# Patient Record
Sex: MALE | Race: BLACK OR AFRICAN AMERICAN | NOT HISPANIC OR LATINO | Employment: FULL TIME | URBAN - METROPOLITAN AREA
[De-identification: names, ages, dates, MRNs, and addresses within clinical notes are randomized per-mention and may not be internally consistent; named-entity substitution may affect disease eponyms.]

---

## 2019-04-24 ENCOUNTER — OFFICE VISIT (OUTPATIENT)
Dept: FAMILY MEDICINE | Facility: CLINIC | Age: 39
End: 2019-04-24
Payer: COMMERCIAL

## 2019-04-24 VITALS
DIASTOLIC BLOOD PRESSURE: 80 MMHG | OXYGEN SATURATION: 99 % | WEIGHT: 313 LBS | HEIGHT: 74 IN | HEART RATE: 66 BPM | SYSTOLIC BLOOD PRESSURE: 121 MMHG | BODY MASS INDEX: 40.17 KG/M2 | RESPIRATION RATE: 16 BRPM

## 2019-04-24 DIAGNOSIS — K21.9 GASTROESOPHAGEAL REFLUX DISEASE, ESOPHAGITIS PRESENCE NOT SPECIFIED: ICD-10-CM

## 2019-04-24 DIAGNOSIS — A04.8 H. PYLORI INFECTION: Primary | ICD-10-CM

## 2019-04-24 DIAGNOSIS — S39.012A STRAIN OF LUMBAR REGION, INITIAL ENCOUNTER: ICD-10-CM

## 2019-04-24 PROCEDURE — 99204 PR OFFICE/OUTPT VISIT, NEW, LEVL IV, 45-59 MIN: ICD-10-PCS | Mod: S$GLB,,, | Performed by: NURSE PRACTITIONER

## 2019-04-24 PROCEDURE — 99204 OFFICE O/P NEW MOD 45 MIN: CPT | Mod: S$GLB,,, | Performed by: NURSE PRACTITIONER

## 2019-04-24 PROCEDURE — 3008F BODY MASS INDEX DOCD: CPT | Mod: CPTII,S$GLB,, | Performed by: NURSE PRACTITIONER

## 2019-04-24 PROCEDURE — 3008F PR BODY MASS INDEX (BMI) DOCUMENTED: ICD-10-PCS | Mod: CPTII,S$GLB,, | Performed by: NURSE PRACTITIONER

## 2019-04-24 RX ORDER — SUCRALFATE 1 G/1
1 TABLET ORAL 2 TIMES DAILY
Qty: 60 TABLET | Refills: 0 | Status: SHIPPED | OUTPATIENT
Start: 2019-04-24 | End: 2021-03-30 | Stop reason: SDUPTHER

## 2019-04-24 RX ORDER — CYCLOBENZAPRINE HCL 10 MG
10 TABLET ORAL 3 TIMES DAILY PRN
Qty: 30 TABLET | Refills: 0 | Status: SHIPPED | OUTPATIENT
Start: 2019-04-24 | End: 2019-05-04

## 2019-04-24 NOTE — PROGRESS NOTES
Subjective:      Patient ID: Jorge A Lehman is a 38 y.o. male.    Chief Complaint: Establish Care (No prior pcp) and h pylori      Patient is a 38-year-old male here to establish primary care.     No medical history.    Current smoker. Drinks ETOH on occasions. Denies drug use.     Employed at [employer] as . .       States he was seen at an urgent care close to 4 weeks ago.  He was diagnosed with Helicobacter pylori infection and given tri pack of amoxicillin, clarithromycin, and Prevacid. PT states he finished antibiotics apx 2 weeks ago.  He currently denies epigastric pain. Denies N/V/D. Denies post prandial pain. Still has some GERD. He is no longer on the PPI.  He is trying to avoid foods that cause GERD.  These include spicy foods, red sauce, and cokes. Denies fever, chills. Appetite is normal.  He is instructed to get a primary care provider to test for resolution of the H pylori.         His 2nd complaint today is left lower back pain. The back pain started while he was at work at [employer].  He denies doing any heavy lifting at the time.  The pain initially started while on shift. Described as a sharp pain. Intermittent.  Pain is improving. Notices it when he bends certain ways. His wife was able to induce the pain by pressing on his back. Location is left lumbar/thoracic region. Denies dysuria, hematuria, suprapubic pain. Denies incontinence.       No other issues voiced.       ROS:   Review of Systems   Constitutional: Negative.    HENT: Negative.    Eyes: Negative.    Respiratory: Negative.    Cardiovascular: Negative.    Gastrointestinal: Negative.    Endocrine: Negative.    Genitourinary: Negative.    Musculoskeletal: Negative.    Skin: Negative.    Allergic/Immunologic: Negative.    Neurological: Negative.    Hematological: Negative.    Psychiatric/Behavioral: Negative.    All other systems reviewed and are negative.    Objective:   Physical Exam   Constitutional: He is  oriented to person, place, and time. He appears well-developed and well-nourished.   HENT:   Head: Normocephalic and atraumatic.   Right Ear: Hearing normal.   Left Ear: Hearing normal.   Nose: Nose normal.   Mouth/Throat: Uvula is midline, oropharynx is clear and moist and mucous membranes are normal.       Eyes: Pupils are equal, round, and reactive to light. No scleral icterus.   Neck: Normal range of motion. Neck supple.   Cardiovascular: Normal rate, regular rhythm and normal heart sounds. Exam reveals no gallop.   No murmur heard.  Pulmonary/Chest: Effort normal and breath sounds normal.   Abdominal: Soft. Normal appearance and bowel sounds are normal. There is no tenderness.   Musculoskeletal: Normal range of motion. He exhibits no edema.        Lumbar back: He exhibits tenderness. He exhibits normal range of motion and no bony tenderness.        Back:    Lymphadenopathy:     He has no cervical adenopathy.        Right: No supraclavicular adenopathy present.        Left: No supraclavicular adenopathy present.   Neurological: He is alert and oriented to person, place, and time.   Skin: Skin is warm and dry. Capillary refill takes less than 2 seconds.   Psychiatric: He has a normal mood and affect. His behavior is normal. Judgment and thought content normal.   Nursing note and vitals reviewed.    Assessment:     1. H. pylori infection    2. Gastroesophageal reflux disease, esophagitis presence not specified    3. Strain of lumbar region, initial encounter      Plan:     Problem List Items Addressed This Visit        GI    H. pylori infection - Primary    Overview     Finished Tripack of Clarithromycin, amoxil, and prevacid apx 2 weeks ago.  Will test for clearance on 5/22/19.          Current Assessment & Plan     Issued Carafate BID to help control GERD. Will start him on mod dose Nexium once confirmed clearance of H. Pylori.       Discussed that he likely has multivitamin def as a result of this. Will test  iron, D, B during his next visit. RTC one week after stool sample given. PT given hand written order for testing.          Relevant Medications    sucralfate (CARAFATE) 1 gram tablet    Other Relevant Orders    H. pylori antigen, stool    Gastroesophageal reflux disease    Relevant Medications    sucralfate (CARAFATE) 1 gram tablet    Other Relevant Orders    H. pylori antigen, stool       Orthopedic    Strain of lumbar region    Current Assessment & Plan     Will issue Flexeril PRN.  Pt advised to not take while at work as it can cause drowsiness.         Relevant Medications    sucralfate (CARAFATE) 1 gram tablet    Other Relevant Orders    H. pylori antigen, stool

## 2019-04-24 NOTE — ASSESSMENT & PLAN NOTE
Issued Carafate BID to help control GERD. Will start him on mod dose Nexium once confirmed clearance of H. Pylori.       Discussed that he likely has multivitamin def as a result of this. Will test iron, D, B during his next visit. RTC one week after stool sample given. PT given hand written order for testing.

## 2019-05-26 LAB — Lab: NOT DETECTED

## 2019-05-28 ENCOUNTER — TELEPHONE (OUTPATIENT)
Dept: FAMILY MEDICINE | Facility: CLINIC | Age: 39
End: 2019-05-28

## 2019-05-28 NOTE — TELEPHONE ENCOUNTER
Notes recorded by Ashley Roach on 5/28/2019 at 10:13 AM CDT  Pt notified  ------    Notes recorded by Daniel Dotson NP on 5/28/2019 at 8:02 AM CDT  He has cleared the H. Pylori infection successfully. Symptoms can return bc its chronic. RTC as needed.

## 2020-07-22 ENCOUNTER — OFFICE VISIT (OUTPATIENT)
Dept: FAMILY MEDICINE | Facility: CLINIC | Age: 40
End: 2020-07-22
Payer: COMMERCIAL

## 2020-07-22 VITALS
RESPIRATION RATE: 18 BRPM | WEIGHT: 315 LBS | HEIGHT: 74 IN | SYSTOLIC BLOOD PRESSURE: 143 MMHG | BODY MASS INDEX: 40.43 KG/M2 | OXYGEN SATURATION: 95 % | DIASTOLIC BLOOD PRESSURE: 90 MMHG | HEART RATE: 68 BPM

## 2020-07-22 DIAGNOSIS — Z86.19 HISTORY OF HELICOBACTER PYLORI INFECTION: ICD-10-CM

## 2020-07-22 DIAGNOSIS — G89.29 CHRONIC LEFT-SIDED LOW BACK PAIN WITHOUT SCIATICA: ICD-10-CM

## 2020-07-22 DIAGNOSIS — M54.9 DORSALGIA, UNSPECIFIED: ICD-10-CM

## 2020-07-22 DIAGNOSIS — R10.13 EPIGASTRIC DISCOMFORT: Primary | ICD-10-CM

## 2020-07-22 DIAGNOSIS — R19.5 DARK STOOLS: ICD-10-CM

## 2020-07-22 DIAGNOSIS — M54.50 CHRONIC LEFT-SIDED LOW BACK PAIN WITHOUT SCIATICA: ICD-10-CM

## 2020-07-22 DIAGNOSIS — K62.5 BRBPR (BRIGHT RED BLOOD PER RECTUM): ICD-10-CM

## 2020-07-22 DIAGNOSIS — K21.9 GASTROESOPHAGEAL REFLUX DISEASE, ESOPHAGITIS PRESENCE NOT SPECIFIED: ICD-10-CM

## 2020-07-22 PROCEDURE — 99215 PR OFFICE/OUTPT VISIT, EST, LEVL V, 40-54 MIN: ICD-10-PCS | Mod: S$GLB,,, | Performed by: NURSE PRACTITIONER

## 2020-07-22 PROCEDURE — 99215 OFFICE O/P EST HI 40 MIN: CPT | Mod: S$GLB,,, | Performed by: NURSE PRACTITIONER

## 2020-07-22 RX ORDER — PANTOPRAZOLE SODIUM 40 MG/1
40 TABLET, DELAYED RELEASE ORAL DAILY
Qty: 30 TABLET | Refills: 11 | Status: SHIPPED | OUTPATIENT
Start: 2020-07-22 | End: 2021-03-30 | Stop reason: SDUPTHER

## 2020-07-22 RX ORDER — CYCLOBENZAPRINE HCL 10 MG
10 TABLET ORAL NIGHTLY
Qty: 30 TABLET | Refills: 0 | Status: SHIPPED | OUTPATIENT
Start: 2020-07-22 | End: 2020-08-01

## 2020-07-22 RX ORDER — SUCRALFATE 1 G/1
1 TABLET ORAL 2 TIMES DAILY
Qty: 60 TABLET | Refills: 0 | Status: SHIPPED | OUTPATIENT
Start: 2020-07-22 | End: 2021-03-30
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
## 2020-07-23 LAB
ABS NRBC COUNT: 0 X 10 3/UL (ref 0–0.01)
ABSOLUTE BASOPHIL: 0.03 X 10 3/UL (ref 0–0.22)
ABSOLUTE EOSINOPHIL: 0.25 X 10 3/UL (ref 0.04–0.54)
ABSOLUTE IMMATURE GRAN: 0.03 X 10 3/UL (ref 0–0.04)
ABSOLUTE LYMPHOCYTE: 2.72 X 10 3/UL (ref 0.86–4.75)
ABSOLUTE MONOCYTE: 0.52 X 10 3/UL (ref 0.22–1.08)
ALBUMIN SERPL-MCNC: 4.3 G/DL (ref 3.5–5.2)
ALBUMIN/GLOB SERPL ELPH: 1.7 {RATIO} (ref 1–2.7)
ALP ISOS SERPL LEV INH-CCNC: 70 U/L (ref 40–130)
ALT (SGPT): 99 U/L (ref 0–41)
ANION GAP SERPL CALC-SCNC: 8 MMOL/L (ref 8–17)
AST SERPL-CCNC: 45 U/L (ref 0–40)
BASOPHILS NFR BLD: 0.5 % (ref 0.2–1.2)
BILIRUBIN, TOTAL: 0.26 MG/DL (ref 0–1.2)
BUN/CREAT SERPL: 11 (ref 6–20)
CALCIUM SERPL-MCNC: 8.7 MG/DL (ref 8.6–10.2)
CARBON DIOXIDE, CO2: 28 MMOL/L (ref 22–29)
CHLORIDE: 103 MMOL/L (ref 98–107)
CREAT SERPL-MCNC: 1.13 MG/DL (ref 0.7–1.2)
EOSINOPHIL NFR BLD: 3.9 % (ref 0.7–7)
GFR ESTIMATION: 71.51
GLOBULIN: 2.5 G/DL (ref 1.5–4.5)
GLUCOSE: 124 MG/DL (ref 74–106)
HCT VFR BLD AUTO: 46.4 % (ref 42–52)
HELICOBACTER PYLORI: NEGATIVE
HGB BLD-MCNC: 14.7 G/DL (ref 14–18)
IMMATURE GRANULOCYTES: 0.5 % (ref 0–0.5)
LYMPHOCYTES NFR BLD: 42.4 % (ref 19.3–53.1)
MCH RBC QN AUTO: 29.5 PG (ref 27–32)
MCHC RBC AUTO-ENTMCNC: 31.7 G/DL (ref 32–36)
MCV RBC AUTO: 93 FL (ref 80–94)
MONOCYTES NFR BLD: 8.1 % (ref 4.7–12.5)
NEUTROPHILS ABSOLUTE COUNT: 2.86 X 10 3/UL (ref 2.15–7.56)
NEUTROPHILS NFR BLD: 44.6 % (ref 34–71.1)
NUCLEATED RED BLOOD CELLS: 0 /100 WBC (ref 0–0.2)
PLATELET # BLD AUTO: 235 X 10 3/UL (ref 135–400)
POTASSIUM: 4.3 MMOL/L (ref 3.5–5.1)
PROT SNV-MCNC: 6.8 G/DL (ref 6.4–8.3)
RBC # BLD AUTO: 4.99 X 10 6/UL (ref 4.7–6.1)
RDW-SD: 43.1 FL (ref 37–54)
SODIUM: 139 MMOL/L (ref 136–145)
UREA NITROGEN (BUN): 12.4 MG/DL (ref 6–20)
WBC # BLD: 6.41 X 10 3/UL (ref 4.3–10.8)

## 2020-08-05 ENCOUNTER — OFFICE VISIT (OUTPATIENT)
Dept: FAMILY MEDICINE | Facility: CLINIC | Age: 40
End: 2020-08-05
Payer: COMMERCIAL

## 2020-08-05 VITALS
SYSTOLIC BLOOD PRESSURE: 110 MMHG | OXYGEN SATURATION: 97 % | HEART RATE: 78 BPM | RESPIRATION RATE: 16 BRPM | DIASTOLIC BLOOD PRESSURE: 80 MMHG

## 2020-08-05 DIAGNOSIS — M54.9 DORSALGIA, UNSPECIFIED: ICD-10-CM

## 2020-08-05 DIAGNOSIS — R11.0 NAUSEA: ICD-10-CM

## 2020-08-05 DIAGNOSIS — K21.9 GASTROESOPHAGEAL REFLUX DISEASE, ESOPHAGITIS PRESENCE NOT SPECIFIED: ICD-10-CM

## 2020-08-05 DIAGNOSIS — M54.50 CHRONIC LEFT-SIDED LOW BACK PAIN WITHOUT SCIATICA: ICD-10-CM

## 2020-08-05 DIAGNOSIS — G89.29 CHRONIC LEFT-SIDED LOW BACK PAIN WITHOUT SCIATICA: ICD-10-CM

## 2020-08-05 DIAGNOSIS — R74.01 TRANSAMINITIS: Primary | ICD-10-CM

## 2020-08-05 PROCEDURE — 99214 OFFICE O/P EST MOD 30 MIN: CPT | Mod: S$GLB,,, | Performed by: NURSE PRACTITIONER

## 2020-08-05 PROCEDURE — 99214 PR OFFICE/OUTPT VISIT, EST, LEVL IV, 30-39 MIN: ICD-10-PCS | Mod: S$GLB,,, | Performed by: NURSE PRACTITIONER

## 2020-08-05 RX ORDER — ONDANSETRON 4 MG/1
4 TABLET, ORALLY DISINTEGRATING ORAL EVERY 6 HOURS PRN
Qty: 30 TABLET | Refills: 0 | Status: SHIPPED | OUTPATIENT
Start: 2020-08-05 | End: 2021-03-30

## 2020-08-05 RX ORDER — TIZANIDINE 2 MG/1
4 TABLET ORAL EVERY 6 HOURS PRN
Qty: 80 TABLET | Refills: 0 | Status: SHIPPED | OUTPATIENT
Start: 2020-08-05 | End: 2020-08-15
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
## 2020-08-05 NOTE — ASSESSMENT & PLAN NOTE
F/u in 6 weeks. IF no improvement on meds, will reorder MRI L spine.     Plan to get L spine today to assess disc spaces.       Trial Tizanidine as he was unable to tolerate Flexeril.

## 2020-08-05 NOTE — ASSESSMENT & PLAN NOTE
Better control w/ Protonix and carafate.  Instructed to continue meds.       He has not been scheduled by GI since last visit. Will call Dr. Cano to f/u on referral.

## 2021-03-30 ENCOUNTER — OFFICE VISIT (OUTPATIENT)
Dept: FAMILY MEDICINE | Facility: CLINIC | Age: 41
End: 2021-03-30
Payer: COMMERCIAL

## 2021-03-30 VITALS
HEART RATE: 71 BPM | DIASTOLIC BLOOD PRESSURE: 89 MMHG | RESPIRATION RATE: 16 BRPM | OXYGEN SATURATION: 96 % | SYSTOLIC BLOOD PRESSURE: 120 MMHG

## 2021-03-30 DIAGNOSIS — K62.5 BRBPR (BRIGHT RED BLOOD PER RECTUM): ICD-10-CM

## 2021-03-30 DIAGNOSIS — R74.01 TRANSAMINITIS: ICD-10-CM

## 2021-03-30 DIAGNOSIS — R10.13 EPIGASTRIC DISCOMFORT: ICD-10-CM

## 2021-03-30 DIAGNOSIS — K21.9 GASTROESOPHAGEAL REFLUX DISEASE, UNSPECIFIED WHETHER ESOPHAGITIS PRESENT: Primary | ICD-10-CM

## 2021-03-30 PROCEDURE — 99214 OFFICE O/P EST MOD 30 MIN: CPT | Mod: S$GLB,,, | Performed by: NURSE PRACTITIONER

## 2021-03-30 PROCEDURE — 99214 PR OFFICE/OUTPT VISIT, EST, LEVL IV, 30-39 MIN: ICD-10-PCS | Mod: S$GLB,,, | Performed by: NURSE PRACTITIONER

## 2021-03-30 RX ORDER — SUCRALFATE 1 G/1
1 TABLET ORAL 2 TIMES DAILY
Qty: 60 TABLET | Refills: 1 | Status: SHIPPED | OUTPATIENT
Start: 2021-03-30 | End: 2023-11-14

## 2021-03-30 RX ORDER — PANTOPRAZOLE SODIUM 40 MG/1
40 TABLET, DELAYED RELEASE ORAL DAILY
Qty: 30 TABLET | Refills: 5 | Status: SHIPPED | OUTPATIENT
Start: 2021-03-30 | End: 2023-11-14

## 2021-05-12 ENCOUNTER — PATIENT MESSAGE (OUTPATIENT)
Dept: RESEARCH | Facility: HOSPITAL | Age: 41
End: 2021-05-12

## 2023-11-14 ENCOUNTER — OFFICE VISIT (OUTPATIENT)
Dept: INFECTIOUS DISEASES | Facility: CLINIC | Age: 43
End: 2023-11-14

## 2023-11-14 VITALS — DIASTOLIC BLOOD PRESSURE: 89 MMHG | SYSTOLIC BLOOD PRESSURE: 127 MMHG | OXYGEN SATURATION: 98 % | HEART RATE: 77 BPM

## 2023-11-14 DIAGNOSIS — R03.0 WHITE COAT SYNDROME WITH HIGH BLOOD PRESSURE BUT WITHOUT HYPERTENSION: Primary | ICD-10-CM

## 2023-11-14 DIAGNOSIS — Z76.89 ESTABLISHING CARE WITH NEW DOCTOR, ENCOUNTER FOR: ICD-10-CM

## 2023-11-14 PROCEDURE — 99202 PR OFFICE/OUTPT VISIT, NEW, LEVL II, 15-29 MIN: ICD-10-PCS | Mod: S$GLB,,, | Performed by: NURSE PRACTITIONER

## 2023-11-14 PROCEDURE — 99202 OFFICE O/P NEW SF 15 MIN: CPT | Mod: S$GLB,,, | Performed by: NURSE PRACTITIONER

## 2023-11-14 RX ORDER — LISINOPRIL 2.5 MG/1
2.5 TABLET ORAL DAILY
Qty: 30 TABLET | Refills: 0 | Status: SHIPPED | OUTPATIENT
Start: 2023-11-14 | End: 2023-11-15 | Stop reason: SDUPTHER

## 2023-11-14 NOTE — PROGRESS NOTES
Subjective:      Patient ID: Jorge A Lehman is a 43 y.o. male.    Chief Complaint: Establish Care (Elevated BP during work physical x2)      Patient is a 38-year-old male       Current smoker. Drinks ETOH on occasions. Denies drug use.      .       The patient is here today for elevated blood pressure reading.  Specifically when he goes for employment.  He reports his blood pressure is typically in the 120s to lower 130s at home.  He can not get employed until he passes his physical.  Currently asymptomatic      No other issues voiced.       Past Medical History:   Diagnosis Date    GERD (gastroesophageal reflux disease)     H. pylori infection     Lumbar strain       Social History     Socioeconomic History    Marital status:    Tobacco Use    Smoking status: Former    Smokeless tobacco: Never   Substance and Sexual Activity    Alcohol use: Yes    Drug use: Never      Family History   Problem Relation Age of Onset    Diabetes Father     Hypertension Maternal Grandmother         ROS:   Review of Systems   Constitutional:  Negative for appetite change, chills and fever.   Eyes:  Negative for visual disturbance.   Respiratory:  Negative for cough, chest tightness, shortness of breath and wheezing.    Cardiovascular:  Negative for chest pain.   Gastrointestinal:  Negative for abdominal pain, diarrhea, nausea and vomiting.   Endocrine: Negative for polydipsia, polyphagia and polyuria.   Genitourinary:  Negative for difficulty urinating, dysuria, flank pain and hematuria.   Musculoskeletal:  Negative for back pain and neck pain.   Skin:  Negative for rash.   Neurological:  Negative for dizziness, tremors and headaches.   Hematological:  Negative for adenopathy. Does not bruise/bleed easily.   Psychiatric/Behavioral:  Negative for confusion, dysphoric mood, hallucinations, sleep disturbance and suicidal ideas.      Objective:   Physical Exam  Vitals and nursing note reviewed.   Constitutional:       General:  He is awake. He is not in acute distress.     Appearance: Normal appearance. He is well-developed, well-groomed and normal weight. He is not ill-appearing.   Eyes:      General: No scleral icterus.     Pupils: Pupils are equal, round, and reactive to light.   Neck:      Vascular: No carotid bruit.   Cardiovascular:      Rate and Rhythm: Normal rate and regular rhythm.      Pulses: Normal pulses.      Heart sounds: Normal heart sounds.   Pulmonary:      Effort: Pulmonary effort is normal.   Abdominal:      General: Abdomen is flat.   Skin:     Coloration: Skin is not jaundiced.      Findings: No rash.   Neurological:      Mental Status: He is alert and oriented to person, place, and time. Mental status is at baseline.   Psychiatric:         Attention and Perception: Attention and perception normal.         Mood and Affect: Mood and affect normal.         Speech: Speech normal.         Behavior: Behavior normal. Behavior is cooperative.         Thought Content: Thought content normal.         Cognition and Memory: Cognition and memory normal.         Judgment: Judgment normal.       Assessment:     1. White coat syndrome with high blood pressure but without hypertension    2. Establishing care with new doctor, encounter for      No images are attached to the encounter.   Plan:     Problem List Items Addressed This Visit    None  Visit Diagnoses       White coat syndrome with high blood pressure but without hypertension    -  Primary    The patient will take lisinopril 4 hours prior to his employment exam. Advised on side effects and ortho hypo.    Relevant Medications    lisinopriL (PRINIVIL,ZESTRIL) 2.5 MG tablet    Establishing care with new doctor, encounter for              Procedures     No visits with results within 1 Month(s) from this visit.   Latest known visit with results is:   Office Visit on 07/22/2020   Component Date Value Ref Range Status    WBC 07/23/2020 6.41  4.3 - 10.8 X 10 3/ul Final    RBC  07/23/2020 4.99  4.7 - 6.1 X 10 6/ul Final    RDW-SD 07/23/2020 43.1  37 - 54 fl Final    Hemoglobin 07/23/2020 14.7  14 - 18 g/dL Final    Hematocrit 07/23/2020 46.4  42 - 52 % Final    MCV 07/23/2020 93.0  80 - 94 fl Final    MCH 07/23/2020 29.5  27 - 32 pg Final    MCHC 07/23/2020 31.7 (L)  32 - 36 g/dL Final    Platelets 07/23/2020 235  135 - 400 X 10 3/ul Final    Neutrophils 07/23/2020 44.6  34 - 71.1 % Final    Lymphocytes 07/23/2020 42.4  19.3 - 53.1 % Final    Monocytes 07/23/2020 8.1  4.7 - 12.5 % Final    Eosinophils 07/23/2020 3.9  0.7 - 7.0 % Final    Basophils 07/23/2020 0.5  0.2 - 1.2 % Final    Neutrophils, Abs 07/23/2020 2.86  2.15 - 7.56 X 10 3/ul Final    Lymphocytes Absolute 07/23/2020 2.72  0.86 - 4.75 X 10 3/ul Final    Monocytes Absolute 07/23/2020 0.52  0.22 - 1.08 X 10 3/ul Final    Eosinophils Absolute 07/23/2020 0.25  0.04 - 0.54 X 10 3/ul Final    Basophils Absolute 07/23/2020 0.03  0.00 - 0.22 X 10 3/ul Final    Immature Granulocytes Absolute 07/23/2020 0.03  0 - 0.04 X 10 3/ul Final    Immature Granulocytes 07/23/2020 0.5  0 - 0.5 % Final    IG includes metamyelocytes, myelocytes, and promyelocytes    nRBC# 07/23/2020 0.0  0 - 0.2 /100 WBC Final    nRBC Count Absolute 07/23/2020 0.000  0 - 0.012 x 10 3/ul Final    Comment: NOTE  Testing performed at:  [facility], [address] [place] [address]  [address] CLIA #:[id number]      Glucose 07/23/2020 124 (H)  74 - 106 mg/dL Final    BUN 07/23/2020 12.4  6 - 20 mg/dL Final    Creatinine 07/23/2020 1.13  0.70 - 1.20 mg/dL Final    AST 07/23/2020 45 (H)  0 - 40 U/L Final    ALT (SGPT) 07/23/2020 99 (H)  0 - 41 U/L Final    Alkaline Phosphatase 07/23/2020 70  40 - 130 U/L Final    Calcium 07/23/2020 8.7  8.6 - 10.2 mg/dL Final    Protein, Total 07/23/2020 6.8  6.4 - 8.3 g/dL Final    Albumin 07/23/2020 4.3  3.5 - 5.2 g/dL Final    BILIRUBIN, TOTAL 07/23/2020 0.26  0.00 - 1.20 mg/dL Final    Sodium 07/23/2020 139  136 - 145 mmol/L  Final    Potassium 07/23/2020 4.3  3.5 - 5.1 mmol/L Final    Chloride 07/23/2020 103  98 - 107 mmol/L Final    CO2 07/23/2020 28  22 - 29 mmol/L Final    Globulin 07/23/2020 2.5  1.5 - 4.5 g/dL Final    Albumin/Globulin Ratio 07/23/2020 1.7  1.0 - 2.7 Final    BUN/Creatinine Ratio 07/23/2020 11.0  6 - 20 Final    GFR ESTIMATION 07/23/2020 71.51  >60.00 Final    Comment: GFR estimation is reported as mL/min/1.73m squared.  It is recommended  that GFR values for  be multiplied x 1.212.  The  higher the GFR, the better the kidney function.  A GFR of >60 is  considered normal (depending on age and whether the patient is a male  or female.      Anion Gap 07/23/2020 8.0  8.0 - 17.0 mmol/L Final    Comment: NOTE  Testing performed at:  [facility], [address] [place] [address] CLIA #:[id number]      HELICOBACTER PYLORI 07/23/2020 NEGATIVE  NEGATIVE Final    Comment: Both the American College of Gastroenterology and the American  Gastroenterology Association recommend either the breath or stool  antigen tests as the preferred testing modalities for active H. pylori  infection.  Performance characteristics for persons under the age of 18 have not  been established with this test.  NOTE  Testing performed at:  [facility], [address] [place] [address]  [address] CLIA #:[id number]          No results found in the last 30 days.       Duration of encounter:  minutes  This includes face-to-face time and non face-to-face time preparing to see the patient (eg, review of tests), obtaining and/or reviewing separately obtained history, documenting clinical information in the electronic or other health record, independently interpreting resultsand communicating results to the patient/family/caregiver, or care coordination    All diagnostic data (labs/imaging) was reviewed with the patient and/or family member in the room.  All questions were answered to their liking. The patient and/or  family member voiced understanding of all instructions provided. Expectations regarding follow up and treatment plan were voiced and confirmed prior to departure. The patient was given orders/instructions at the end of the visit for reference. They were instructed to notify my office if they have not been contacted for imaging/referrals/labs/results in 1-2 weeks. They voiced understanding of all of the above.     Follow up:     There are no Patient Instructions on file for this visit.     Follow up if symptoms worsen or fail to improve, for PT will RTC when he is insured. .

## 2023-11-15 ENCOUNTER — TELEPHONE (OUTPATIENT)
Dept: FAMILY MEDICINE | Facility: CLINIC | Age: 43
End: 2023-11-15

## 2023-11-15 DIAGNOSIS — R03.0 WHITE COAT SYNDROME WITH HIGH BLOOD PRESSURE BUT WITHOUT HYPERTENSION: ICD-10-CM

## 2023-11-15 RX ORDER — LISINOPRIL 2.5 MG/1
2.5 TABLET ORAL DAILY
Qty: 30 TABLET | Refills: 0 | Status: SHIPPED | OUTPATIENT
Start: 2023-11-15 | End: 2024-11-14

## 2023-11-15 NOTE — TELEPHONE ENCOUNTER
----- Message from Maricel Redding MA sent at 11/15/2023 10:53 AM CST -----    ----- Message -----  From: Brook Delvalle  Sent: 11/15/2023  10:33 AM CST  To: Nila Sanchez Staff    States he was seen yesterday. He would like his prescription to go to the [facility] in [place], not the [facility]. Please call pt [phone]. Thank you

## 2023-11-28 ENCOUNTER — TELEPHONE (OUTPATIENT)
Dept: FAMILY MEDICINE | Facility: CLINIC | Age: 43
End: 2023-11-28

## 2023-11-28 DIAGNOSIS — I10 HYPERTENSION, UNSPECIFIED TYPE: ICD-10-CM

## 2023-11-28 DIAGNOSIS — G47.30 SLEEP APNEA, UNSPECIFIED TYPE: Primary | ICD-10-CM

## 2023-11-28 DIAGNOSIS — R40.0 DAYTIME SOMNOLENCE: ICD-10-CM

## 2023-11-28 NOTE — TELEPHONE ENCOUNTER
----- Message from Maricel Redding MA sent at 11/27/2023 12:38 PM CST -----  Contact: Jorge A  Did he ever discuss needing this ref at his appt?        ----- Message -----  From: Zeny Hrenandez  Sent: 11/27/2023  11:26 AM CST  To: Nila Sanchez Staff    Patient is calling to speak with the nurse regarding orders. 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 Please give patient a call back at [phone]  Thanks  LEVY

## 2023-12-04 ENCOUNTER — DOCUMENTATION ONLY (OUTPATIENT)
Dept: SLEEP MEDICINE | Facility: HOSPITAL | Age: 43
End: 2023-12-04

## 2023-12-04 PROBLEM — G47.30 SEVERE SLEEP APNEA: Status: ACTIVE | Noted: 2023-12-04

## 2023-12-05 ENCOUNTER — PATIENT MESSAGE (OUTPATIENT)
Dept: FAMILY MEDICINE | Facility: CLINIC | Age: 43
End: 2023-12-05

## 2023-12-06 NOTE — TELEPHONE ENCOUNTER
It appears that the company may have ordered the supplies already. At least that's how I'm reading the report.

## 2023-12-14 ENCOUNTER — PATIENT MESSAGE (OUTPATIENT)
Dept: INFECTIOUS DISEASES | Facility: CLINIC | Age: 43
End: 2023-12-14

## 2024-02-21 DIAGNOSIS — I10 HYPERTENSION, UNSPECIFIED TYPE: ICD-10-CM

## 2024-02-29 ENCOUNTER — OFFICE VISIT (OUTPATIENT)
Dept: FAMILY MEDICINE | Facility: CLINIC | Age: 44
End: 2024-02-29
Payer: MEDICAID

## 2024-02-29 VITALS
WEIGHT: 315 LBS | OXYGEN SATURATION: 97 % | SYSTOLIC BLOOD PRESSURE: 110 MMHG | HEART RATE: 76 BPM | DIASTOLIC BLOOD PRESSURE: 73 MMHG | BODY MASS INDEX: 44.04 KG/M2

## 2024-02-29 DIAGNOSIS — Z11.4 SCREENING FOR HIV (HUMAN IMMUNODEFICIENCY VIRUS): ICD-10-CM

## 2024-02-29 DIAGNOSIS — Z00.00 PREVENTATIVE HEALTH CARE: Primary | ICD-10-CM

## 2024-02-29 DIAGNOSIS — Z12.12 SCREENING FOR COLORECTAL CANCER: ICD-10-CM

## 2024-02-29 DIAGNOSIS — Z91.89 ENCOUNTER FOR HEPATITIS C VIRUS SCREENING TEST FOR HIGH RISK PATIENT: ICD-10-CM

## 2024-02-29 DIAGNOSIS — Z13.1 SCREENING FOR DIABETES MELLITUS: ICD-10-CM

## 2024-02-29 DIAGNOSIS — Z12.11 SCREENING FOR COLORECTAL CANCER: ICD-10-CM

## 2024-02-29 DIAGNOSIS — Z11.59 ENCOUNTER FOR HEPATITIS C VIRUS SCREENING TEST FOR HIGH RISK PATIENT: ICD-10-CM

## 2024-02-29 DIAGNOSIS — K21.9 GASTROESOPHAGEAL REFLUX DISEASE, UNSPECIFIED WHETHER ESOPHAGITIS PRESENT: ICD-10-CM

## 2024-02-29 DIAGNOSIS — Z13.220 SCREENING FOR LIPOID DISORDERS: ICD-10-CM

## 2024-02-29 PROCEDURE — 1159F MED LIST DOCD IN RCRD: CPT | Mod: CPTII,S$GLB,, | Performed by: NURSE PRACTITIONER

## 2024-02-29 PROCEDURE — 3074F SYST BP LT 130 MM HG: CPT | Mod: CPTII,S$GLB,, | Performed by: NURSE PRACTITIONER

## 2024-02-29 PROCEDURE — 3078F DIAST BP <80 MM HG: CPT | Mod: CPTII,S$GLB,, | Performed by: NURSE PRACTITIONER

## 2024-02-29 PROCEDURE — 3008F BODY MASS INDEX DOCD: CPT | Mod: CPTII,S$GLB,, | Performed by: NURSE PRACTITIONER

## 2024-02-29 PROCEDURE — 99396 PREV VISIT EST AGE 40-64: CPT | Mod: S$GLB,,, | Performed by: NURSE PRACTITIONER

## 2024-02-29 RX ORDER — PANTOPRAZOLE SODIUM 20 MG/1
20 TABLET, DELAYED RELEASE ORAL DAILY
Qty: 42 TABLET | Refills: 0 | Status: SHIPPED | OUTPATIENT
Start: 2024-02-29 | End: 2024-04-11

## 2024-02-29 NOTE — PROGRESS NOTES
Subjective:      Patient ID: Jorge A Lehman is a 43 y.o. male.    Chief Complaint: Annual Exam      Patient is a 43-year-old male       Current smoker. Drinks ETOH on occasions. Denies drug use.      .       Patient is here today for annual wellness visit.  He is due for colonoscopy at this time.  He reports feeling well overall.      No other issues voiced.       Past Medical History:   Diagnosis Date    GERD (gastroesophageal reflux disease)     H. pylori infection     Lumbar strain       Social History     Socioeconomic History    Marital status:    Tobacco Use    Smoking status: Former    Smokeless tobacco: Never   Substance and Sexual Activity    Alcohol use: Yes    Drug use: Never     Social Determinants of Health     Financial Resource Strain: Patient Declined (2/29/2024)    Overall Financial Resource Strain (CARDIA)     Difficulty of Paying Living Expenses: Patient declined   Food Insecurity: Food Insecurity Present (2/29/2024)    Hunger Vital Sign     Worried About Running Out of Food in the Last Year: Sometimes true     Ran Out of Food in the Last Year: Patient declined   Transportation Needs: No Transportation Needs (2/29/2024)    PRAPARE - Transportation     Lack of Transportation (Medical): No     Lack of Transportation (Non-Medical): No   Physical Activity: Unknown (2/29/2024)    Exercise Vital Sign     Days of Exercise per Week: 1 day   Stress: No Stress Concern Present (2/29/2024)    [employer] - Occupational Stress Questionnaire     Feeling of Stress : Not at all   Social Connections: Unknown (2/29/2024)    Social Connection and Isolation Panel [NHANES]     Frequency of Communication with Friends and Family: More than three times a week     Frequency of Social Gatherings with Friends and Family: Once a week     Active Member of Clubs or Organizations: Yes     Attends Club or Organization Meetings: 1 to 4 times per year     Marital Status:    Housing  Stability: Low Risk  (2/29/2024)    Housing Stability Vital Sign     Unable to Pay for Housing in the Last Year: No     Number of Places Lived in the Last Year: 1     Unstable Housing in the Last Year: No      Family History   Problem Relation Age of Onset    Diabetes Father     Hypertension Maternal Grandmother         ROS:   Review of Systems   Constitutional:  Negative for appetite change, chills and fever.   Eyes:  Negative for visual disturbance.   Respiratory:  Negative for cough, chest tightness, shortness of breath and wheezing.    Cardiovascular:  Negative for chest pain.   Gastrointestinal:  Negative for abdominal pain, diarrhea, nausea and vomiting.   Endocrine: Negative for polydipsia, polyphagia and polyuria.   Genitourinary:  Negative for difficulty urinating, dysuria, flank pain and hematuria.   Musculoskeletal:  Negative for back pain and neck pain.   Skin:  Negative for rash.   Neurological:  Negative for dizziness, tremors and headaches.   Hematological:  Negative for adenopathy. Does not bruise/bleed easily.   Psychiatric/Behavioral:  Negative for confusion, dysphoric mood, hallucinations, sleep disturbance and suicidal ideas.      Objective:   Physical Exam  Vitals and nursing note reviewed.   Constitutional:       General: He is awake. He is not in acute distress.     Appearance: Normal appearance. He is well-developed, well-groomed and normal weight. He is not ill-appearing.   HENT:      Head: Normocephalic and atraumatic.      Right Ear: Ear canal and external ear normal.      Left Ear: Ear canal and external ear normal.      Nose: Nose normal.      Mouth/Throat:      Mouth: Mucous membranes are moist.   Eyes:      General: No scleral icterus.     Pupils: Pupils are equal, round, and reactive to light.   Neck:      Vascular: No carotid bruit.   Cardiovascular:      Rate and Rhythm: Normal rate and regular rhythm.      Pulses: Normal pulses.      Heart sounds: Normal heart sounds. No murmur  heard.  Pulmonary:      Effort: Pulmonary effort is normal.      Breath sounds: No wheezing.   Abdominal:      General: Abdomen is flat. Bowel sounds are normal.      Palpations: Abdomen is soft.   Musculoskeletal:      Right lower leg: No edema.      Left lower leg: No edema.   Skin:     Coloration: Skin is not jaundiced.      Findings: No rash.   Neurological:      Mental Status: He is alert and oriented to person, place, and time. Mental status is at baseline.   Psychiatric:         Attention and Perception: Attention and perception normal.         Mood and Affect: Mood and affect normal.         Speech: Speech normal.         Behavior: Behavior normal. Behavior is cooperative.         Thought Content: Thought content normal.         Cognition and Memory: Cognition and memory normal.         Judgment: Judgment normal.       Assessment:     1. Preventative health care    2. Screening for colorectal cancer    3. Screening for lipoid disorders    4. Encounter for hepatitis C virus screening test for high risk patient    5. Screening for HIV (human immunodeficiency virus)    6. Screening for diabetes mellitus    7. Gastroesophageal reflux disease, unspecified whether esophagitis present      No images are attached to the encounter.   Plan:     Problem List Items Addressed This Visit          GI    Gastroesophageal reflux disease    Relevant Medications    pantoprazole (PROTONIX) 20 MG tablet     Other Visit Diagnoses       Preventative health care    -  Primary    Relevant Orders    Hepatitis C antibody    HIV 1/2 Ag/Ab (4th Gen)    Urinalysis, Reflex to Urine Culture Urine, Clean Catch    TSH+Free T4    Lipid Panel    Hemoglobin A1C    Comprehensive Metabolic Panel    CBC Auto Differential    Screening for colorectal cancer        Relevant Orders    Ambulatory referral/consult to General Surgery    Hepatitis C antibody    HIV 1/2 Ag/Ab (4th Gen)    Urinalysis, Reflex to Urine Culture Urine, Clean Catch    TSH+Free  T4    Lipid Panel    Hemoglobin A1C    Comprehensive Metabolic Panel    CBC Auto Differential    Screening for lipoid disorders        Relevant Orders    Hepatitis C antibody    HIV 1/2 Ag/Ab (4th Gen)    Urinalysis, Reflex to Urine Culture Urine, Clean Catch    TSH+Free T4    Lipid Panel    Hemoglobin A1C    Comprehensive Metabolic Panel    CBC Auto Differential    Encounter for hepatitis C virus screening test for high risk patient        Relevant Orders    Hepatitis C antibody    HIV 1/2 Ag/Ab (4th Gen)    Urinalysis, Reflex to Urine Culture Urine, Clean Catch    TSH+Free T4    Lipid Panel    Hemoglobin A1C    Comprehensive Metabolic Panel    CBC Auto Differential    Screening for HIV (human immunodeficiency virus)        Relevant Orders    Hepatitis C antibody    HIV 1/2 Ag/Ab (4th Gen)    Urinalysis, Reflex to Urine Culture Urine, Clean Catch    TSH+Free T4    Lipid Panel    Hemoglobin A1C    Comprehensive Metabolic Panel    CBC Auto Differential    Screening for diabetes mellitus        Relevant Orders    Hepatitis C antibody    HIV 1/2 Ag/Ab (4th Gen)    Urinalysis, Reflex to Urine Culture Urine, Clean Catch    TSH+Free T4    Lipid Panel    Hemoglobin A1C    Comprehensive Metabolic Panel    CBC Auto Differential          Procedures     No visits with results within 1 Month(s) from this visit.   Latest known visit with results is:   Office Visit on 07/22/2020   Component Date Value Ref Range Status    WBC 07/23/2020 6.41  4.3 - 10.8 X 10 3/ul Final    RBC 07/23/2020 4.99  4.7 - 6.1 X 10 6/ul Final    RDW-SD 07/23/2020 43.1  37 - 54 fl Final    Hemoglobin 07/23/2020 14.7  14 - 18 g/dL Final    Hematocrit 07/23/2020 46.4  42 - 52 % Final    MCV 07/23/2020 93.0  80 - 94 fl Final    MCH 07/23/2020 29.5  27 - 32 pg Final    MCHC 07/23/2020 31.7 (L)  32 - 36 g/dL Final    Platelets 07/23/2020 235  135 - 400 X 10 3/ul Final    Neutrophils 07/23/2020 44.6  34 - 71.1 % Final    Lymphocytes 07/23/2020 42.4  19.3 - 53.1  % Final    Monocytes 07/23/2020 8.1  4.7 - 12.5 % Final    Eosinophils 07/23/2020 3.9  0.7 - 7.0 % Final    Basophils 07/23/2020 0.5  0.2 - 1.2 % Final    Neutrophils, Abs 07/23/2020 2.86  2.15 - 7.56 X 10 3/ul Final    Lymphocytes Absolute 07/23/2020 2.72  0.86 - 4.75 X 10 3/ul Final    Monocytes Absolute 07/23/2020 0.52  0.22 - 1.08 X 10 3/ul Final    Eosinophils Absolute 07/23/2020 0.25  0.04 - 0.54 X 10 3/ul Final    Basophils Absolute 07/23/2020 0.03  0.00 - 0.22 X 10 3/ul Final    Immature Granulocytes Absolute 07/23/2020 0.03  0 - 0.04 X 10 3/ul Final    Immature Granulocytes 07/23/2020 0.5  0 - 0.5 % Final    IG includes metamyelocytes, myelocytes, and promyelocytes    nRBC# 07/23/2020 0.0  0 - 0.2 /100 WBC Final    nRBC Count Absolute 07/23/2020 0.000  0 - 0.012 x 10 3/ul Final    Comment: NOTE  Testing performed at:  [facility], [address] [place] [address] CLIA #:[id number]      Glucose 07/23/2020 124 (H)  74 - 106 mg/dL Final    BUN 07/23/2020 12.4  6 - 20 mg/dL Final    Creatinine 07/23/2020 1.13  0.70 - 1.20 mg/dL Final    AST 07/23/2020 45 (H)  0 - 40 U/L Final    ALT (SGPT) 07/23/2020 99 (H)  0 - 41 U/L Final    Alkaline Phosphatase 07/23/2020 70  40 - 130 U/L Final    Calcium 07/23/2020 8.7  8.6 - 10.2 mg/dL Final    Protein, Total 07/23/2020 6.8  6.4 - 8.3 g/dL Final    Albumin 07/23/2020 4.3  3.5 - 5.2 g/dL Final    BILIRUBIN, TOTAL 07/23/2020 0.26  0.00 - 1.20 mg/dL Final    Sodium 07/23/2020 139  136 - 145 mmol/L Final    Potassium 07/23/2020 4.3  3.5 - 5.1 mmol/L Final    Chloride 07/23/2020 103  98 - 107 mmol/L Final    CO2 07/23/2020 28  22 - 29 mmol/L Final    Globulin 07/23/2020 2.5  1.5 - 4.5 g/dL Final    Albumin/Globulin Ratio 07/23/2020 1.7  1.0 - 2.7 Final    BUN/Creatinine Ratio 07/23/2020 11.0  6 - 20 Final    GFR ESTIMATION 07/23/2020 71.51  >60.00 Final    Comment: GFR estimation is reported as mL/min/1.73m squared.  It is recommended  that GFR values for   be multiplied x 1.212.  The  higher the GFR, the better the kidney function.  A GFR of >60 is  considered normal (depending on age and whether the patient is a male  or female.      Anion Gap 07/23/2020 8.0  8.0 - 17.0 mmol/L Final    Comment: NOTE  Testing performed at:  [facility], [address] [place] [address]  [id number] CLIA #:[id number]      HELICOBACTER PYLORI 07/23/2020 NEGATIVE  NEGATIVE Final    Comment: Both the American College of Gastroenterology and the American  Gastroenterology Association recommend either the breath or stool  antigen tests as the preferred testing modalities for active H. pylori  infection.  Performance characteristics for persons under the age of 18 have not  been established with this test.  NOTE  Testing performed at:  [facility], [address] [place] [address]  [address] CLIA #:[id number]          No results found in the last 30 days.       Duration of encounter:  minutes  This includes face-to-face time and non face-to-face time preparing to see the patient (eg, review of tests), obtaining and/or reviewing separately obtained history, documenting clinical information in the electronic or other health record, independently interpreting resultsand communicating results to the patient/family/caregiver, or care coordination      DISCLAIMER: This note was prepared with [employer] voice recognition transcription software. Garbled syntax, mangled pronouns, and other bizarre constructions may be attributed to that software system.     All diagnostic data (labs/imaging) was reviewed with the patient and/or family member in the room.  All questions were answered to their liking. The patient and/or family member voiced understanding of all instructions provided. Expectations regarding follow up and treatment plan were voiced and confirmed prior to departure. The patient was given orders/instructions at the end of the visit for reference. They were  instructed to notify my office if they have not been contacted for imaging/referrals/labs/results in 1-2 weeks. They voiced understanding of all of the above.     Follow up:     There are no Patient Instructions on file for this visit.     Follow up in about 1 year (around 2/28/2025), or if symptoms worsen or fail to improve.

## 2024-03-02 LAB
ABS NRBC COUNT: 0 X 10 3/UL (ref 0–0.01)
ABSOLUTE BASOPHIL: 0.03 X 10 3/UL (ref 0–0.22)
ABSOLUTE EOSINOPHIL: 0.3 X 10 3/UL (ref 0.04–0.54)
ABSOLUTE IMMATURE GRAN: 0.01 X 10 3/UL (ref 0–0.04)
ABSOLUTE LYMPHOCYTE: 2.74 X 10 3/UL (ref 0.86–4.75)
ABSOLUTE MONOCYTE: 0.42 X 10 3/UL (ref 0.22–1.08)
ALBUMIN SERPL-MCNC: 4.2 G/DL (ref 3.5–5.2)
ALBUMIN/GLOB SERPL ELPH: 1.5 {RATIO} (ref 1–2.7)
ALP ISOS SERPL LEV INH-CCNC: 82 U/L (ref 40–130)
ALT (SGPT): 21 U/L (ref 0–41)
AMORPH URATE CRY URNS QL MICRO: NEGATIVE
ANION GAP SERPL CALC-SCNC: 9 MMOL/L (ref 8–17)
AST SERPL-CCNC: 17 U/L (ref 0–40)
BACTERIA #/AREA URNS HPF: ABNORMAL /[HPF]
BASOPHILS NFR BLD: 0.5 % (ref 0.2–1.2)
BILIRUB UR QL STRIP: NEGATIVE
BILIRUBIN, TOTAL: 0.33 MG/DL (ref 0–1.2)
BUN/CREAT SERPL: 10.4 (ref 6–20)
CALCIUM SERPL-MCNC: 8.7 MG/DL (ref 8.6–10.2)
CARBON DIOXIDE, CO2: 28 MMOL/L (ref 22–29)
CHLORIDE: 104 MMOL/L (ref 98–107)
CHOLEST SERPL-MSCNC: 194 MG/DL (ref 100–200)
CLARITY UR: CLEAR
COLOR UR: YELLOW
CREAT SERPL-MCNC: 1.25 MG/DL (ref 0.7–1.2)
EOSINOPHIL NFR BLD: 4.8 % (ref 0.7–7)
EPITHELIAL CELLS: ABNORMAL
ESTIMATED AVERAGE GLUCOSE: 121 MG/DL
GFR ESTIMATION: 72.37 ML/MIN/1.73M2
GLOBULIN: 2.8 G/DL (ref 1.5–4.5)
GLUCOSE (UA): NEGATIVE MG/DL
GLUCOSE: 107 MG/DL (ref 74–106)
HBA1C MFR BLD: 5.8 % (ref 4–6)
HCT VFR BLD AUTO: 44.1 % (ref 42–52)
HCV IGG SERPL QL IA: NONREACTIVE
HDLC SERPL-MCNC: 40 MG/DL
HGB BLD-MCNC: 14.6 G/DL (ref 14–18)
HIV 1+2 AB+HIV1 P24 AG SERPL QL IA: NONREACTIVE
HYALINE CASTS #/AREA URNS LPF: ABNORMAL /[LPF]
IMMATURE GRANULOCYTES: 0.2 % (ref 0–0.5)
KETONES UR QL STRIP: NEGATIVE MG/DL
LDL/HDL RATIO: 3.6 (ref 1–3)
LDLC SERPL CALC-MCNC: 142 MG/DL (ref 0–100)
LEUKOCYTE ESTERASE UR QL STRIP: ABNORMAL
LYMPHOCYTES NFR BLD: 43.8 % (ref 19.3–53.1)
MCH RBC QN AUTO: 29.6 PG (ref 27–32)
MCHC RBC AUTO-ENTMCNC: 33.1 G/DL (ref 32–36)
MCV RBC AUTO: 89.3 FL (ref 80–94)
MONOCYTES NFR BLD: 6.7 % (ref 4.7–12.5)
MUCOUS THREADS URNS QL MICRO: ABNORMAL
NEUTROPHILS # BLD AUTO: 2.76 X 10 3/UL (ref 2.15–7.56)
NEUTROPHILS NFR BLD: 44 % (ref 34–71.1)
NITRITE UR QL STRIP: NEGATIVE
NUCLEATED RED BLOOD CELLS: 0 /100 WBC (ref 0–0.2)
OCCULT BLOOD: NEGATIVE
PH, URINE: 6 (ref 5–7.5)
PLATELET # BLD AUTO: 258 X 10 3/UL (ref 135–400)
POTASSIUM: 4.6 MMOL/L (ref 3.5–5.1)
PROT SNV-MCNC: 7 G/DL (ref 6.4–8.3)
PROT UR QL STRIP: 25 MG/DL
RBC # BLD AUTO: 4.94 X 10 6/UL (ref 4.7–6.1)
RBC/HPF: ABNORMAL
RDW-SD: 41.3 FL (ref 37–54)
SODIUM: 141 MMOL/L (ref 136–145)
SP GR UR STRIP: 1.02 (ref 1–1.03)
T4, FREE: 1.44 NG/DL (ref 0.93–1.7)
TRIGL SERPL-MCNC: 60 MG/DL (ref 0–150)
TSH SERPL DL<=0.005 MIU/L-ACNC: 1.27 UIU/ML (ref 0.27–4.2)
UREA NITROGEN (BUN): 13 MG/DL (ref 6–20)
URINE CULTURE, ROUTINE: NORMAL
UROBILINOGEN, URINE: NORMAL E.U./DL (ref 0–1)
WBC # BLD: 6.26 X 10 3/UL (ref 4.3–10.8)
WBC/HPF: ABNORMAL

## 2024-03-04 ENCOUNTER — PATIENT MESSAGE (OUTPATIENT)
Dept: FAMILY MEDICINE | Facility: CLINIC | Age: 44
End: 2024-03-04
Payer: MEDICAID

## 2024-04-11 ENCOUNTER — TELEPHONE (OUTPATIENT)
Dept: FAMILY MEDICINE | Facility: CLINIC | Age: 44
End: 2024-04-11
Payer: MEDICAID

## 2024-04-15 ENCOUNTER — PATIENT MESSAGE (OUTPATIENT)
Dept: ADMINISTRATIVE | Facility: HOSPITAL | Age: 44
End: 2024-04-15
Payer: MEDICAID

## 2024-04-19 ENCOUNTER — PATIENT OUTREACH (OUTPATIENT)
Dept: ADMINISTRATIVE | Facility: HOSPITAL | Age: 44
End: 2024-04-19
Payer: MEDICAID

## 2024-04-19 DIAGNOSIS — Z12.11 SCREENING FOR COLON CANCER: Primary | ICD-10-CM

## 2024-04-19 NOTE — PROGRESS NOTES
Replying to Campaign Questionnaire for [facility]: Colon Cancer Screening     Spoke to patient.  Informed him that a new order for [facility] will be placed and he will get a call from scheduling to get him set up.  Patient expressed gratitude

## 2024-04-30 ENCOUNTER — OFFICE VISIT (OUTPATIENT)
Dept: FAMILY MEDICINE | Facility: CLINIC | Age: 44
End: 2024-04-30
Payer: MEDICAID

## 2024-04-30 VITALS — BODY MASS INDEX: 41.98 KG/M2 | SYSTOLIC BLOOD PRESSURE: 124 MMHG | DIASTOLIC BLOOD PRESSURE: 75 MMHG | WEIGHT: 315 LBS

## 2024-04-30 DIAGNOSIS — E66.01 CLASS 3 SEVERE OBESITY WITH BODY MASS INDEX (BMI) OF 40.0 TO 44.9 IN ADULT, UNSPECIFIED OBESITY TYPE, UNSPECIFIED WHETHER SERIOUS COMORBIDITY PRESENT: ICD-10-CM

## 2024-04-30 DIAGNOSIS — Z01.818 PREOPERATIVE CLEARANCE: Primary | ICD-10-CM

## 2024-04-30 PROCEDURE — 3008F BODY MASS INDEX DOCD: CPT | Mod: CPTII,S$GLB,, | Performed by: NURSE PRACTITIONER

## 2024-04-30 PROCEDURE — 3074F SYST BP LT 130 MM HG: CPT | Mod: CPTII,S$GLB,, | Performed by: NURSE PRACTITIONER

## 2024-04-30 PROCEDURE — 3078F DIAST BP <80 MM HG: CPT | Mod: CPTII,S$GLB,, | Performed by: NURSE PRACTITIONER

## 2024-04-30 PROCEDURE — 3044F HG A1C LEVEL LT 7.0%: CPT | Mod: CPTII,S$GLB,, | Performed by: NURSE PRACTITIONER

## 2024-04-30 PROCEDURE — 99213 OFFICE O/P EST LOW 20 MIN: CPT | Mod: S$GLB,,, | Performed by: NURSE PRACTITIONER

## 2024-04-30 PROCEDURE — 1159F MED LIST DOCD IN RCRD: CPT | Mod: CPTII,S$GLB,, | Performed by: NURSE PRACTITIONER

## 2024-04-30 NOTE — PROGRESS NOTES
Subjective:      Patient ID: Jorge A Lehman is a 43 y.o. male.    Chief Complaint: PAPERWORK      Patient is a 43-year-old male       Current smoker. Drinks ETOH on occasions. Denies drug use.      .       Patient is here today for preoperative clearance.  Patient is pending bariatric intervention.   He reports feeling well overall.      No other issues voiced.       Past Medical History:   Diagnosis Date    GERD (gastroesophageal reflux disease)     H. pylori infection     Lumbar strain       Social History     Socioeconomic History    Marital status:    Tobacco Use    Smoking status: Former    Smokeless tobacco: Never   Substance and Sexual Activity    Alcohol use: Yes    Drug use: Never     Social Determinants of Health     Financial Resource Strain: Patient Declined (2/29/2024)    Overall Financial Resource Strain (CARDIA)     Difficulty of Paying Living Expenses: Patient declined   Food Insecurity: Food Insecurity Present (2/29/2024)    Hunger Vital Sign     Worried About Running Out of Food in the Last Year: Sometimes true     Ran Out of Food in the Last Year: Patient declined   Transportation Needs: No Transportation Needs (2/29/2024)    PRAPARE - Transportation     Lack of Transportation (Medical): No     Lack of Transportation (Non-Medical): No   Physical Activity: Unknown (2/29/2024)    Exercise Vital Sign     Days of Exercise per Week: 1 day   Stress: No Stress Concern Present (2/29/2024)    [employer] - Occupational Stress Questionnaire     Feeling of Stress : Not at all   Social Connections: Unknown (2/29/2024)    Social Connection and Isolation Panel [NHANES]     Frequency of Communication with Friends and Family: More than three times a week     Frequency of Social Gatherings with Friends and Family: Once a week     Active Member of Clubs or Organizations: Yes     Attends Club or Organization Meetings: 1 to 4 times per year     Marital Status:     Housing Stability: Low Risk  (2/29/2024)    Housing Stability Vital Sign     Unable to Pay for Housing in the Last Year: No     Number of Places Lived in the Last Year: 1     Unstable Housing in the Last Year: No      Family History   Problem Relation Name Age of Onset    Diabetes Father      Hypertension Maternal Grandmother          ROS:   Review of Systems   Constitutional:  Negative for appetite change, chills and fever.   HENT:  Negative for sore throat and trouble swallowing.    Eyes:  Negative for visual disturbance.   Respiratory:  Negative for cough, chest tightness, shortness of breath and wheezing.    Cardiovascular:  Negative for chest pain and palpitations.   Gastrointestinal:  Negative for abdominal pain, diarrhea, nausea and vomiting.   Endocrine: Negative for polydipsia, polyphagia and polyuria.   Genitourinary:  Negative for difficulty urinating, dysuria, flank pain and hematuria.   Musculoskeletal:  Negative for back pain and neck pain.   Skin:  Negative for rash.   Neurological:  Negative for dizziness, tremors and headaches.   Hematological:  Negative for adenopathy. Does not bruise/bleed easily.   Psychiatric/Behavioral:  Negative for confusion, dysphoric mood, hallucinations, sleep disturbance and suicidal ideas.      Objective:   Physical Exam  Vitals and nursing note reviewed.   Constitutional:       General: He is awake. He is not in acute distress.     Appearance: Normal appearance. He is well-developed, well-groomed and normal weight. He is not ill-appearing.   HENT:      Head: Normocephalic.      Right Ear: External ear normal.      Left Ear: External ear normal.      Mouth/Throat:      Mouth: Mucous membranes are moist.   Eyes:      General: No scleral icterus.     Pupils: Pupils are equal, round, and reactive to light.   Neck:      Vascular: No carotid bruit.   Cardiovascular:      Rate and Rhythm: Normal rate and regular rhythm.      Pulses: Normal pulses.      Heart sounds: Normal  heart sounds. No murmur heard.  Pulmonary:      Effort: Pulmonary effort is normal.      Breath sounds: No wheezing.   Abdominal:      General: Abdomen is flat. Bowel sounds are normal.      Palpations: Abdomen is soft.      Tenderness: There is no abdominal tenderness.   Musculoskeletal:      Cervical back: Normal range of motion.      Right lower leg: No edema.      Left lower leg: No edema.   Skin:     Coloration: Skin is not jaundiced.      Findings: No rash.   Neurological:      Mental Status: He is alert and oriented to person, place, and time. Mental status is at baseline.   Psychiatric:         Attention and Perception: Attention and perception normal.         Mood and Affect: Mood and affect normal.         Speech: Speech normal.         Behavior: Behavior normal. Behavior is cooperative.         Thought Content: Thought content normal.         Cognition and Memory: Cognition and memory normal.         Judgment: Judgment normal.       Assessment:     1. Preoperative clearance    2. Class 3 severe obesity with body mass index (BMI) of 40.0 to 44.9 in adult, unspecified obesity type, unspecified whether serious comorbidity present      No images are attached to the encounter.   Plan:     Problem List Items Addressed This Visit    None  Visit Diagnoses       Preoperative clearance    -  Primary    PT cleared from PCP standpoint. Paperwork filled out.    Relevant Orders    IN OFFICE EKG 12-LEAD (to [facility])    Class 3 severe obesity with body mass index (BMI) of 40.0 to 44.9 in adult, unspecified obesity type, unspecified whether serious comorbidity present        Currently pending bariatric intervention          Procedures     No visits with results within 1 Month(s) from this visit.   Latest known visit with results is:   Office Visit on 02/29/2024   Component Date Value Ref Range Status    HCV Ab 02/29/2024 NONREACTIVE  NONREACTIVE Final    Comment: NOTE  Testing performed at:  [facility], [address] [place] [address]  [place]  [address] CLIA #:[id number]      HIV 1/2 Ag/Ab 02/29/2024 NONREACTIVE  NONREACTIVE Final    Comment: A reactive result does not distinguish between HIV-1 p24 antigen,  HIV-1 antibody, HIV-2 antibody, and HIV-1 group O antibody.  All reactive samples are repeated for verification and then sent to  the reference laboratory for confirmation per CDC recommendation.  Please do not interpret as REACTIVE until confirmation testing is  complete.  NOTE  Testing performed at:  [facility], [address] [place] [address]  [address] CLIA #:[id number]      Color, UA 02/29/2024 YELLOW   Final    Clarity, UA 02/29/2024 CLEAR   Final    Specific Gravity,UA 02/29/2024 1.020  1.005 - 1.030 Final    pH, Urine 02/29/2024 6  5 - 7.5 Final    Leukocytes, UA 02/29/2024 SMALL (A)  NEGATIVE Final    Nitrite, Urine 02/29/2024 NEGATIVE  NEGATIVE Final    Protein, UA 02/29/2024 25 (H)  NEGATIVE mg/dL Final    Glucose, UA 02/29/2024 NEGATIVE  NEGATIVE mg/dL Final    Ketones, UA 02/29/2024 NEGATIVE  NEGATIVE mg/dL Final    Urobilinogen, urine 02/29/2024 NORMAL  0 - 1.0 E.U./dL Final    Bilirubin (UA) 02/29/2024 NEGATIVE  NEGATIVE Final    Occult Blood 02/29/2024 NEGATIVE  NEGATIVE Final    WBC/HPF 02/29/2024 10-15 (A)  <5 Final    RBC/HPF 02/29/2024 RARE  <5 Final    Amorphous, UA 02/29/2024 NEGATIVE   Final    Bacteria, UA 02/29/2024 1+ (A)  NEG-TRACE Final    Epithelial Cells 02/29/2024 FEW  NEGATIVE-FEW Final    Mucus, UA 02/29/2024 1+ (A)  NEGATIVE Final    Hyaline Casts, UA 02/29/2024 0-2 PER LPF   Final    Comment: NOTE  Testing performed at:  [facility], [address] [place] [address]  [address] CLIA #:[id number]      Cholesterol 02/29/2024 194  100 - 200 mg/dL Final    Comment: Desirable  <200  Borderline 200-240  High risk  >240      Triglycerides 02/29/2024 60  0 - 150 mg/dL Final    HDL 02/29/2024 40 (L)  >60 mg/dL Final    Comment: <40 mg/dL Major risk factor for CHD  >60 mg/dL Negative risk  factor for CHD      LDL Cholesterol 02/29/2024 142.0 (H)  0 - 100 mg/dL Final    LDL/HDL Ratio 02/29/2024 3.6 (H)  1 - 3 Final    Comment: NOTE  Testing performed at:  [facility], [address] [place] [address]  [address] CLIA #:[id number]      Hemoglobin A1C 02/29/2024 5.8  4.0 - 6.0 % Final    EST AVERAGE GLUCOSE 02/29/2024 121 (H)  NORMAL MG/DL Final    Comment: NOTE  Testing performed at:  [facility], [address] [place] [address]  [address] CLIA #:[id number]      Glucose 02/29/2024 107 (H)  74 - 106 mg/dL Final    BUN 02/29/2024 13.0  6 - 20 mg/dL Final    Creatinine 02/29/2024 1.25 (H)  0.70 - 1.20 mg/dL Final    Recommend repeat creatinine within 90 days.    AST 02/29/2024 17  0 - 40 U/L Final    ALT (SGPT) 02/29/2024 21  0 - 41 U/L Final    Alkaline Phosphatase 02/29/2024 82  40 - 130 U/L Final    Calcium 02/29/2024 8.7  8.6 - 10.2 mg/dL Final    Protein, Total 02/29/2024 7.0  6.4 - 8.3 g/dL Final    Albumin 02/29/2024 4.2  3.5 - 5.2 g/dL Final    BILIRUBIN, TOTAL 02/29/2024 0.33  0.00 - 1.20 mg/dL Final    Sodium 02/29/2024 141  136 - 145 mmol/L Final    Potassium 02/29/2024 4.6  3.5 - 5.1 mmol/L Final    Chloride 02/29/2024 104  98 - 107 mmol/L Final    CO2 02/29/2024 28  22 - 29 mmol/L Final    Globulin 02/29/2024 2.8  1.5 - 4.5 g/dL Final    Albumin/Globulin Ratio 02/29/2024 1.5  1.0 - 2.7 Final    BUN/Creatinine Ratio 02/29/2024 10.4  6 - 20 Final    GFR ESTIMATION 02/29/2024 72.37  >60.00 mL/min/1.73m2 Final    Anion Gap 02/29/2024 9.0  8.0 - 17.0 mmol/L Final    Comment: NOTE  Testing performed at:  [facility], [address] [place] [address]  [address] CLIA #:[id number]      WBC 02/29/2024 6.26  4.3 - 10.8 X 10 3/ul Final    RBC 02/29/2024 4.94  4.7 - 6.1 X 10 6/ul Final    RDW-SD 02/29/2024 41.3  37 - 54 fl Final    Hemoglobin 02/29/2024 14.6  14 - 18 g/dL Final    Hematocrit 02/29/2024 44.1  42 - 52 % Final    MCV 02/29/2024 89.3  80 - 94 fl Final    MCH 02/29/2024  29.6  27 - 32 pg Final    MCHC 02/29/2024 33.1  32 - 36 g/dL Final    Platelets 02/29/2024 258  135 - 400 X 10 3/ul Final    Neutrophils 02/29/2024 44.0  34 - 71.1 % Final    Lymphocytes 02/29/2024 43.8  19.3 - 53.1 % Final    Monocytes 02/29/2024 6.7  4.7 - 12.5 % Final    Eosinophils 02/29/2024 4.8  0.7 - 7.0 % Final    Basophils 02/29/2024 0.5  0.2 - 1.2 % Final    Neutrophils Absolute 02/29/2024 2.76  2.15 - 7.56 X 10 3/ul Final    Lymphocytes Absolute 02/29/2024 2.74  0.86 - 4.75 X 10 3/ul Final    Monocytes Absolute 02/29/2024 0.42  0.22 - 1.08 X 10 3/ul Final    Eosinophils Absolute 02/29/2024 0.30  0.04 - 0.54 X 10 3/ul Final    Basophils Absolute 02/29/2024 0.03  0.00 - 0.22 X 10 3/ul Final    Immature Granulocytes Absolute 02/29/2024 0.01  0 - 0.04 X 10 3/ul Final    Immature Granulocytes 02/29/2024 0.2  0 - 0.5 % Final    IG includes metamyelocytes, myelocytes, and promyelocytes    nRBC# 02/29/2024 0.0  0 - 0.2 /100 WBC Final    nRBC Count Absolute 02/29/2024 0.000  0 - 0.012 x 10 3/ul Final    Comment: NOTE  Testing performed at:  [facility], [address] [place] [address] CLIA #:[id number]      Urine Culture, Routine 02/29/2024    Final    Comment: Source: URINE  Site:  Organism #1                    NO GROWTH  Quantity                      0  NOTE  Testing performed at:  [facility], [address] [place] [address] CLIA #:[id number]      TSH 02/29/2024 1.27  0.27 - 4.20 uIU/mL Final    Comment: NOTE  Testing performed at:  [facility], [address] [place] [address] CLIA #:[id number]      T4, Free 02/29/2024 1.44  0.93 - 1.70 ng/dL Final    Comment: NOTE  Testing performed at:  [facility], [address] [place] [address] CLIA #:[id number]          No results found in the last 30 days.       Duration of encounter:  minutes  This includes face-to-face time and non face-to-face time preparing to see the patient (eg, review  of tests), obtaining and/or reviewing separately obtained history, documenting clinical information in the electronic or other health record, independently interpreting resultsand communicating results to the patient/family/caregiver, or care coordination      DISCLAIMER: This note was prepared with [employer] voice recognition transcription software. Garbled syntax, mangled pronouns, and other bizarre constructions may be attributed to that software system.     All diagnostic data (labs/imaging) was reviewed with the patient and/or family member in the room.  All questions were answered to their liking. The patient and/or family member voiced understanding of all instructions provided. Expectations regarding follow up and treatment plan were voiced and confirmed prior to departure. The patient was given orders/instructions at the end of the visit for reference. They were instructed to notify my office if they have not been contacted for imaging/referrals/labs/results in 1-2 weeks. They voiced understanding of all of the above.     Follow up:     There are no Patient Instructions on file for this visit.     Follow up in about 6 months (around 10/30/2024), or if symptoms worsen or fail to improve.

## 2024-08-01 ENCOUNTER — OFFICE VISIT (OUTPATIENT)
Dept: INFECTIOUS DISEASES | Facility: CLINIC | Age: 44
End: 2024-08-01
Payer: MEDICAID

## 2024-08-01 VITALS
WEIGHT: 307 LBS | DIASTOLIC BLOOD PRESSURE: 74 MMHG | SYSTOLIC BLOOD PRESSURE: 114 MMHG | OXYGEN SATURATION: 98 % | BODY MASS INDEX: 39.42 KG/M2 | HEART RATE: 78 BPM

## 2024-08-01 DIAGNOSIS — Z01.818 PREOPERATIVE CLEARANCE: Primary | ICD-10-CM

## 2024-08-01 NOTE — PROGRESS NOTES
Subjective:      Patient ID: Jorge A Lehman is a 43 y.o. male.    Chief Complaint: bariatric surg clearance      Patient is a 43-year-old male       Current smoker. Drinks ETOH on occasions. Denies drug use.      .       Patient is here today for preoperative clearance.  Patient is pending bariatric intervention.   He reports feeling well overall.      No other issues voiced.       Past Medical History:   Diagnosis Date    GERD (gastroesophageal reflux disease)     H. pylori infection     Lumbar strain       Social History     Socioeconomic History    Marital status:    Tobacco Use    Smoking status: Former    Smokeless tobacco: Never   Substance and Sexual Activity    Alcohol use: Yes    Drug use: Never     Social Determinants of Health     Financial Resource Strain: Patient Declined (2/29/2024)    Overall Financial Resource Strain (CARDIA)     Difficulty of Paying Living Expenses: Patient declined   Food Insecurity: Food Insecurity Present (2/29/2024)    Hunger Vital Sign     Worried About Running Out of Food in the Last Year: Sometimes true     Ran Out of Food in the Last Year: Patient declined   Transportation Needs: No Transportation Needs (2/29/2024)    PRAPARE - Transportation     Lack of Transportation (Medical): No     Lack of Transportation (Non-Medical): No   Physical Activity: Unknown (2/29/2024)    Exercise Vital Sign     Days of Exercise per Week: 1 day   Stress: No Stress Concern Present (2/29/2024)    [employer] - Occupational Stress Questionnaire     Feeling of Stress : Not at all   Housing Stability: Low Risk  (2/29/2024)    Housing Stability Vital Sign     Unable to Pay for Housing in the Last Year: No     Number of Places Lived in the Last Year: 1     Unstable Housing in the Last Year: No      Family History   Problem Relation Name Age of Onset    Diabetes Father      Hypertension Maternal Grandmother          ROS:   Review of Systems   Constitutional:   Negative for appetite change, chills and fever.   HENT:  Negative for sore throat and trouble swallowing.    Eyes:  Negative for visual disturbance.   Respiratory:  Negative for cough, chest tightness, shortness of breath and wheezing.    Cardiovascular:  Negative for chest pain and palpitations.   Gastrointestinal:  Negative for abdominal pain, diarrhea, nausea and vomiting.   Endocrine: Negative for polydipsia, polyphagia and polyuria.   Genitourinary:  Negative for difficulty urinating, dysuria, flank pain and hematuria.   Musculoskeletal:  Negative for back pain and neck pain.   Skin:  Negative for rash.   Neurological:  Negative for dizziness, tremors and headaches.   Hematological:  Negative for adenopathy. Does not bruise/bleed easily.   Psychiatric/Behavioral:  Negative for confusion, dysphoric mood, hallucinations, sleep disturbance and suicidal ideas.      Objective:   Physical Exam  Vitals and nursing note reviewed.   Constitutional:       General: He is awake. He is not in acute distress.     Appearance: Normal appearance. He is well-developed, well-groomed and normal weight. He is not ill-appearing.   HENT:      Head: Normocephalic.      Right Ear: External ear normal.      Left Ear: External ear normal.      Mouth/Throat:      Mouth: Mucous membranes are moist.   Eyes:      General: No scleral icterus.     Pupils: Pupils are equal, round, and reactive to light.   Neck:      Vascular: No carotid bruit.   Cardiovascular:      Rate and Rhythm: Normal rate and regular rhythm.      Pulses: Normal pulses.      Heart sounds: Normal heart sounds. No murmur heard.  Pulmonary:      Effort: Pulmonary effort is normal.      Breath sounds: No wheezing.   Abdominal:      General: Abdomen is flat. Bowel sounds are normal.      Palpations: Abdomen is soft.      Tenderness: There is no abdominal tenderness.   Musculoskeletal:      Cervical back: Normal range of motion.      Right lower leg: No edema.      Left lower  leg: No edema.   Skin:     Coloration: Skin is not jaundiced.      Findings: No rash.   Neurological:      Mental Status: He is alert and oriented to person, place, and time. Mental status is at baseline.   Psychiatric:         Attention and Perception: Attention and perception normal.         Mood and Affect: Mood and affect normal.         Speech: Speech normal.         Behavior: Behavior normal. Behavior is cooperative.         Thought Content: Thought content normal.         Cognition and Memory: Cognition and memory normal.         Judgment: Judgment normal.       Assessment:     1. Preoperative clearance      No images are attached to the encounter.   Plan:     Problem List Items Addressed This Visit    None  Visit Diagnoses       Preoperative clearance    -  Primary    PT is cleared from PCP standpoint. sinus bradycardia per EKG.    Relevant Orders    IN OFFICE EKG 12-LEAD (to [facility])          Procedures     No visits with results within 1 Month(s) from this visit.   Latest known visit with results is:   Office Visit on 02/29/2024   Component Date Value Ref Range Status    HCV Ab 02/29/2024 NONREACTIVE  NONREACTIVE Final    Comment: NOTE  Testing performed at:  [facility], [address] [place] [address]  [id number] CLIA #:[id number]      HIV 1/2 Ag/Ab 02/29/2024 NONREACTIVE  NONREACTIVE Final    Comment: A reactive result does not distinguish between HIV-1 p24 antigen,  HIV-1 antibody, HIV-2 antibody, and HIV-1 group O antibody.  All reactive samples are repeated for verification and then sent to  the reference laboratory for confirmation per CDC recommendation.  Please do not interpret as REACTIVE until confirmation testing is  complete.  NOTE  Testing performed at:  [facility], [address] [place] [address]  [address] CLIA #:[id number]      Color, UA 02/29/2024 YELLOW   Final    Clarity, UA 02/29/2024 CLEAR   Final    Specific Gravity,UA 02/29/2024 1.020  1.005 - 1.030 Final    pH, Urine  02/29/2024 6  5 - 7.5 Final    Leukocytes, UA 02/29/2024 SMALL (A)  NEGATIVE Final    Nitrite, Urine 02/29/2024 NEGATIVE  NEGATIVE Final    Protein, UA 02/29/2024 25 (H)  NEGATIVE mg/dL Final    Glucose, UA 02/29/2024 NEGATIVE  NEGATIVE mg/dL Final    Ketones, UA 02/29/2024 NEGATIVE  NEGATIVE mg/dL Final    Urobilinogen, urine 02/29/2024 NORMAL  0 - 1.0 E.U./dL Final    Bilirubin (UA) 02/29/2024 NEGATIVE  NEGATIVE Final    Occult Blood 02/29/2024 NEGATIVE  NEGATIVE Final    WBC/HPF 02/29/2024 10-15 (A)  <5 Final    RBC/HPF 02/29/2024 RARE  <5 Final    Amorphous, UA 02/29/2024 NEGATIVE   Final    Bacteria, UA 02/29/2024 1+ (A)  NEG-TRACE Final    Epithelial Cells 02/29/2024 FEW  NEGATIVE-FEW Final    Mucus, UA 02/29/2024 1+ (A)  NEGATIVE Final    Hyaline Casts, UA 02/29/2024 0-2 PER LPF   Final    Comment: NOTE  Testing performed at:  [facility], [address] [place] [address] CLIA #:[id number]      Cholesterol 02/29/2024 194  100 - 200 mg/dL Final    Comment: Desirable  <200  Borderline 200-240  High risk  >240      Triglycerides 02/29/2024 60  0 - 150 mg/dL Final    HDL 02/29/2024 40 (L)  >60 mg/dL Final    Comment: <40 mg/dL Major risk factor for CHD  >60 mg/dL Negative risk factor for CHD      LDL Cholesterol 02/29/2024 142.0 (H)  0 - 100 mg/dL Final    LDL/HDL Ratio 02/29/2024 3.6 (H)  1 - 3 Final    Comment: NOTE  Testing performed at:  [facility], [address] [place] [address] CLIA #:[id number]      Hemoglobin A1C 02/29/2024 5.8  4.0 - 6.0 % Final    EST AVERAGE GLUCOSE 02/29/2024 121 (H)  NORMAL MG/DL Final    Comment: NOTE  Testing performed at:  [facility], [address] [place], [place] [address] CLIA #:[id number]      Glucose 02/29/2024 107 (H)  74 - 106 mg/dL Final    BUN 02/29/2024 13.0  6 - 20 mg/dL Final    Creatinine 02/29/2024 1.25 (H)  0.70 - 1.20 mg/dL Final    Recommend repeat creatinine within 90 days.    AST 02/29/2024 17  0 - 40 U/L  Final    ALT (SGPT) 02/29/2024 21  0 - 41 U/L Final    Alkaline Phosphatase 02/29/2024 82  40 - 130 U/L Final    Calcium 02/29/2024 8.7  8.6 - 10.2 mg/dL Final    Protein, Total 02/29/2024 7.0  6.4 - 8.3 g/dL Final    Albumin 02/29/2024 4.2  3.5 - 5.2 g/dL Final    BILIRUBIN, TOTAL 02/29/2024 0.33  0.00 - 1.20 mg/dL Final    Sodium 02/29/2024 141  136 - 145 mmol/L Final    Potassium 02/29/2024 4.6  3.5 - 5.1 mmol/L Final    Chloride 02/29/2024 104  98 - 107 mmol/L Final    CO2 02/29/2024 28  22 - 29 mmol/L Final    Globulin 02/29/2024 2.8  1.5 - 4.5 g/dL Final    Albumin/Globulin Ratio 02/29/2024 1.5  1.0 - 2.7 Final    BUN/Creatinine Ratio 02/29/2024 10.4  6 - 20 Final    GFR ESTIMATION 02/29/2024 72.37  >60.00 mL/min/1.73m2 Final    Anion Gap 02/29/2024 9.0  8.0 - 17.0 mmol/L Final    Comment: NOTE  Testing performed at:  [facility], [address] [place] [address] CLIA #:[id number]      WBC 02/29/2024 6.26  4.3 - 10.8 X 10 3/ul Final    RBC 02/29/2024 4.94  4.7 - 6.1 X 10 6/ul Final    RDW-SD 02/29/2024 41.3  37 - 54 fl Final    Hemoglobin 02/29/2024 14.6  14 - 18 g/dL Final    Hematocrit 02/29/2024 44.1  42 - 52 % Final    MCV 02/29/2024 89.3  80 - 94 fl Final    MCH 02/29/2024 29.6  27 - 32 pg Final    MCHC 02/29/2024 33.1  32 - 36 g/dL Final    Platelets 02/29/2024 258  135 - 400 X 10 3/ul Final    Neutrophils 02/29/2024 44.0  34 - 71.1 % Final    Lymphocytes 02/29/2024 43.8  19.3 - 53.1 % Final    Monocytes 02/29/2024 6.7  4.7 - 12.5 % Final    Eosinophils 02/29/2024 4.8  0.7 - 7.0 % Final    Basophils 02/29/2024 0.5  0.2 - 1.2 % Final    Neutrophils Absolute 02/29/2024 2.76  2.15 - 7.56 X 10 3/ul Final    Lymphocytes Absolute 02/29/2024 2.74  0.86 - 4.75 X 10 3/ul Final    Monocytes Absolute 02/29/2024 0.42  0.22 - 1.08 X 10 3/ul Final    Eosinophils Absolute 02/29/2024 0.30  0.04 - 0.54 X 10 3/ul Final    Basophils Absolute 02/29/2024 0.03  0.00 - 0.22 X 10 3/ul Final    Immature  Granulocytes Absolute 02/29/2024 0.01  0 - 0.04 X 10 3/ul Final    Immature Granulocytes 02/29/2024 0.2  0 - 0.5 % Final    IG includes metamyelocytes, myelocytes, and promyelocytes    nRBC# 02/29/2024 0.0  0 - 0.2 /100 WBC Final    nRBC Count Absolute 02/29/2024 0.000  0 - 0.012 x 10 3/ul Final    Comment: NOTE  Testing performed at:  [facility], [address] [place] [address]  [address] CLIA #:[id number]      Urine Culture, Routine 02/29/2024    Final    Comment: Source: URINE  Site:  Organism #1                    NO GROWTH  Quantity                      0  NOTE  Testing performed at:  [facility], [address] [place] [address]  [address] CLIA #:[id number]      TSH 02/29/2024 1.27  0.27 - 4.20 uIU/mL Final    Comment: NOTE  Testing performed at:  [facility], [address] [place] [address]  [address] CLIA #:[id number]      T4, Free 02/29/2024 1.44  0.93 - 1.70 ng/dL Final    Comment: NOTE  Testing performed at:  [facility], [address] [place] [address]  [address] CLIA #:[id number]          X-Ray Chest 1 View    Result Date: 7/26/2024  [facility] XR CHEST 1 VW      HISTORY:    Preoperative exam.    ICD10:  Z01.818   Preop testing      REFERENCE EXAMS:    None available      FINDINGS - XR CHEST:    Chest - 1 view.  Cardiac silhouette is magnified by the exam technique.  No acute pulmonary disease demonstrated.    No acute fracture demonstrated.             Duration of encounter:  minutes  This includes face-to-face time and non face-to-face time preparing to see the patient (eg, review of tests), obtaining and/or reviewing separately obtained history, documenting clinical information in the electronic or other health record, independently interpreting resultsand communicating results to the patient/family/caregiver, or care coordination      DISCLAIMER: This note was prepared with [employer] voice recognition transcription software. Garbled syntax, mangled pronouns, and other  bizarre constructions may be attributed to that software system.     All diagnostic data (labs/imaging) was reviewed with the patient and/or family member in the room.  All questions were answered to their liking. The patient and/or family member voiced understanding of all instructions provided. Expectations regarding follow up and treatment plan were voiced and confirmed prior to departure. The patient was given orders/instructions at the end of the visit for reference. They were instructed to notify my office if they have not been contacted for imaging/referrals/labs/results in 1-2 weeks. They voiced understanding of all of the above.     Follow up:     There are no Patient Instructions on file for this visit.     Follow up in about 6 months (around 2/1/2025), or if symptoms worsen or fail to improve.

## 2024-08-13 ENCOUNTER — OFFICE VISIT (OUTPATIENT)
Dept: FAMILY MEDICINE | Facility: CLINIC | Age: 44
End: 2024-08-13
Payer: MEDICAID

## 2024-08-13 VITALS — BODY MASS INDEX: 37.23 KG/M2 | DIASTOLIC BLOOD PRESSURE: 78 MMHG | WEIGHT: 290 LBS | SYSTOLIC BLOOD PRESSURE: 118 MMHG

## 2024-08-13 DIAGNOSIS — R11.0 NAUSEA: ICD-10-CM

## 2024-08-13 DIAGNOSIS — Z09 FOLLOW-UP EXAMINATION AFTER GASTROINTESTINAL SURGERY: Primary | ICD-10-CM

## 2024-08-13 PROCEDURE — 99213 OFFICE O/P EST LOW 20 MIN: CPT | Mod: S$GLB,,, | Performed by: NURSE PRACTITIONER

## 2024-08-13 PROCEDURE — 3044F HG A1C LEVEL LT 7.0%: CPT | Mod: CPTII,S$GLB,, | Performed by: NURSE PRACTITIONER

## 2024-08-13 PROCEDURE — 3074F SYST BP LT 130 MM HG: CPT | Mod: CPTII,S$GLB,, | Performed by: NURSE PRACTITIONER

## 2024-08-13 PROCEDURE — 3078F DIAST BP <80 MM HG: CPT | Mod: CPTII,S$GLB,, | Performed by: NURSE PRACTITIONER

## 2024-08-13 PROCEDURE — 1159F MED LIST DOCD IN RCRD: CPT | Mod: CPTII,S$GLB,, | Performed by: NURSE PRACTITIONER

## 2024-08-13 PROCEDURE — 3008F BODY MASS INDEX DOCD: CPT | Mod: CPTII,S$GLB,, | Performed by: NURSE PRACTITIONER

## 2024-08-13 RX ORDER — ONDANSETRON 4 MG/1
4 TABLET, ORALLY DISINTEGRATING ORAL EVERY 6 HOURS PRN
Qty: 30 TABLET | Refills: 0 | Status: SHIPPED | OUTPATIENT
Start: 2024-08-13

## 2024-08-13 NOTE — PROGRESS NOTES
Subjective:      Patient ID: Jorge A Lehman is a 43 y.o. male.    Chief Complaint: Follow-up      Patient is a 43-year-old male       Current smoker. Drinks ETOH on occasions. Denies drug use.      .       Patient is here today for follow up after recent bariatric procedure.   He is 1 week post op.  He reports feeling well overall.  Has some nausea with meals. Ran out of meds.  Has 5 incisions. All are healing well.       No other issues voiced.       Past Medical History:   Diagnosis Date    GERD (gastroesophageal reflux disease)     H. pylori infection     Lumbar strain       Social History     Socioeconomic History    Marital status:    Tobacco Use    Smoking status: Former    Smokeless tobacco: Never   Substance and Sexual Activity    Alcohol use: Yes    Drug use: Never     Social Determinants of Health     Financial Resource Strain: Patient Declined (2/29/2024)    Overall Financial Resource Strain (CARDIA)     Difficulty of Paying Living Expenses: Patient declined   Food Insecurity: No Food Insecurity (8/5/2024)    Received from [facility] [place] [facility]    Hunger Vital Sign     Worried About Running Out of Food in the Last Year: Never true     Ran Out of Food in the Last Year: Never true   Transportation Needs: No Transportation Needs (8/5/2024)    Received from [facility]    PRAPARE - Transportation     Lack of Transportation (Medical): No     Lack of Transportation (Non-Medical): No   Physical Activity: Unknown (2/29/2024)    Exercise Vital Sign     Days of Exercise per Week: 1 day   Stress: No Stress Concern Present (2/29/2024)    [employer] [place] [employer] - Occupational Stress Questionnaire     Feeling of Stress : Not at all   Housing Stability: Low Risk  (2/29/2024)    Housing Stability Vital Sign     Unable to Pay for Housing in the Last Year: No     Number of Places Lived in the Last Year: 1     Unstable Housing in the Last Year: No      Family History   Problem Relation Name Age of  Onset    Diabetes Father      Hypertension Maternal Grandmother          ROS:   Review of Systems   Constitutional:  Negative for appetite change, chills and fever.   HENT:  Negative for sore throat and trouble swallowing.    Eyes:  Negative for visual disturbance.   Respiratory:  Negative for cough, chest tightness, shortness of breath and wheezing.    Cardiovascular:  Negative for chest pain and palpitations.   Gastrointestinal:  Negative for abdominal pain, diarrhea, nausea and vomiting.   Endocrine: Negative for polydipsia, polyphagia and polyuria.   Genitourinary:  Negative for difficulty urinating, dysuria, flank pain and hematuria.   Musculoskeletal:  Negative for back pain and neck pain.   Skin:  Negative for rash.   Neurological:  Negative for dizziness, tremors and headaches.   Hematological:  Negative for adenopathy. Does not bruise/bleed easily.   Psychiatric/Behavioral:  Negative for confusion, dysphoric mood, hallucinations, sleep disturbance and suicidal ideas.      Objective:   Physical Exam  Vitals and nursing note reviewed.   Constitutional:       General: He is awake. He is not in acute distress.     Appearance: Normal appearance. He is well-developed, well-groomed and normal weight. He is not ill-appearing.   HENT:      Head: Normocephalic.      Right Ear: External ear normal.      Left Ear: External ear normal.      Mouth/Throat:      Mouth: Mucous membranes are moist.   Eyes:      General: No scleral icterus.     Pupils: Pupils are equal, round, and reactive to light.   Neck:      Vascular: No carotid bruit.   Cardiovascular:      Rate and Rhythm: Normal rate and regular rhythm.      Pulses: Normal pulses.      Heart sounds: Normal heart sounds. No murmur heard.  Pulmonary:      Effort: Pulmonary effort is normal.      Breath sounds: No wheezing.   Abdominal:      General: Abdomen is flat. Bowel sounds are normal.      Palpations: Abdomen is soft.      Tenderness: There is no abdominal  tenderness.       Musculoskeletal:      Cervical back: Normal range of motion.      Right lower leg: No edema.      Left lower leg: No edema.   Skin:     Coloration: Skin is not jaundiced.      Findings: No rash.   Neurological:      Mental Status: He is alert and oriented to person, place, and time. Mental status is at baseline.   Psychiatric:         Attention and Perception: Attention and perception normal.         Mood and Affect: Mood and affect normal.         Speech: Speech normal.         Behavior: Behavior normal. Behavior is cooperative.         Thought Content: Thought content normal.         Cognition and Memory: Cognition and memory normal.         Judgment: Judgment normal.       Assessment:     1. Follow-up examination after gastrointestinal surgery    2. Nausea      No images are attached to the encounter.   Plan:     Problem List Items Addressed This Visit    None  Visit Diagnoses       Follow-up examination after gastrointestinal surgery    -  Primary    doing well post surgery. has follow up on 22nd. Recommended liquid IV daily to stay hydrated.    Relevant Medications    ondansetron (ZOFRAN-ODT) 4 MG TbDL    Nausea              The patient needed a work excuse.  See letter section written.  He was advised to follow-up with his surgeon for clearance.  I will give him until 08/20/2024.       Procedures     No visits with results within 1 Month(s) from this visit.   Latest known visit with results is:   Office Visit on 02/29/2024   Component Date Value Ref Range Status    HCV Ab 02/29/2024 NONREACTIVE  NONREACTIVE Final    Comment: NOTE  Testing performed at:  [facility], [address] [place] [address]  [address] CLIA #:[id number]      HIV 1/2 Ag/Ab 02/29/2024 NONREACTIVE  NONREACTIVE Final    Comment: A reactive result does not distinguish between HIV-1 p24 antigen,  HIV-1 antibody, HIV-2 antibody, and HIV-1 group O antibody.  All reactive samples are repeated for verification and then  sent to  the reference laboratory for confirmation per CDC recommendation.  Please do not interpret as REACTIVE until confirmation testing is  complete.  NOTE  Testing performed at:  [facility], [address] [place] [address]  [address] CLIA #:[id number]      Color, UA 02/29/2024 YELLOW   Final    Clarity, UA 02/29/2024 CLEAR   Final    Specific Gravity,UA 02/29/2024 1.020  1.005 - 1.030 Final    pH, Urine 02/29/2024 6  5 - 7.5 Final    Leukocytes, UA 02/29/2024 SMALL (A)  NEGATIVE Final    Nitrite, Urine 02/29/2024 NEGATIVE  NEGATIVE Final    Protein, UA 02/29/2024 25 (H)  NEGATIVE mg/dL Final    Glucose, UA 02/29/2024 NEGATIVE  NEGATIVE mg/dL Final    Ketones, UA 02/29/2024 NEGATIVE  NEGATIVE mg/dL Final    Urobilinogen, urine 02/29/2024 NORMAL  0 - 1.0 E.U./dL Final    Bilirubin (UA) 02/29/2024 NEGATIVE  NEGATIVE Final    Occult Blood 02/29/2024 NEGATIVE  NEGATIVE Final    WBC/HPF 02/29/2024 10-15 (A)  <5 Final    RBC/HPF 02/29/2024 RARE  <5 Final    Amorphous, UA 02/29/2024 NEGATIVE   Final    Bacteria, UA 02/29/2024 1+ (A)  NEG-TRACE Final    Epithelial Cells 02/29/2024 FEW  NEGATIVE-FEW Final    Mucus, UA 02/29/2024 1+ (A)  NEGATIVE Final    Hyaline Casts, UA 02/29/2024 0-2 PER LPF   Final    Comment: NOTE  Testing performed at:  [facility], [address] [place] [address]  [address] CLIA #:[id number]      Cholesterol 02/29/2024 194  100 - 200 mg/dL Final    Comment: Desirable  <200  Borderline 200-240  High risk  >240      Triglycerides 02/29/2024 60  0 - 150 mg/dL Final    HDL 02/29/2024 40 (L)  >60 mg/dL Final    Comment: <40 mg/dL Major risk factor for CHD  >60 mg/dL Negative risk factor for CHD      LDL Cholesterol 02/29/2024 142.0 (H)  0 - 100 mg/dL Final    LDL/HDL Ratio 02/29/2024 3.6 (H)  1 - 3 Final    Comment: NOTE  Testing performed at:  [facility], [address] [place] [address]  [address] CLIA #:[id number]      Hemoglobin A1C 02/29/2024 5.8  4.0 - 6.0 % Final     EST AVERAGE GLUCOSE 02/29/2024 121 (H)  NORMAL MG/DL Final    Comment: NOTE  Testing performed at:  [facility], [address] [place] [address]  [address] CLIA #:[id number]      Glucose 02/29/2024 107 (H)  74 - 106 mg/dL Final    BUN 02/29/2024 13.0  6 - 20 mg/dL Final    Creatinine 02/29/2024 1.25 (H)  0.70 - 1.20 mg/dL Final    Recommend repeat creatinine within 90 days.    AST 02/29/2024 17  0 - 40 U/L Final    ALT (SGPT) 02/29/2024 21  0 - 41 U/L Final    Alkaline Phosphatase 02/29/2024 82  40 - 130 U/L Final    Calcium 02/29/2024 8.7  8.6 - 10.2 mg/dL Final    Protein, Total 02/29/2024 7.0  6.4 - 8.3 g/dL Final    Albumin 02/29/2024 4.2  3.5 - 5.2 g/dL Final    BILIRUBIN, TOTAL 02/29/2024 0.33  0.00 - 1.20 mg/dL Final    Sodium 02/29/2024 141  136 - 145 mmol/L Final    Potassium 02/29/2024 4.6  3.5 - 5.1 mmol/L Final    Chloride 02/29/2024 104  98 - 107 mmol/L Final    CO2 02/29/2024 28  22 - 29 mmol/L Final    Globulin 02/29/2024 2.8  1.5 - 4.5 g/dL Final    Albumin/Globulin Ratio 02/29/2024 1.5  1.0 - 2.7 Final    BUN/Creatinine Ratio 02/29/2024 10.4  6 - 20 Final    GFR ESTIMATION 02/29/2024 72.37  >60.00 mL/min/1.73m2 Final    Anion Gap 02/29/2024 9.0  8.0 - 17.0 mmol/L Final    Comment: NOTE  Testing performed at:  [facility], [address] [place] [address]  [address] CLIA #:[id number]      WBC 02/29/2024 6.26  4.3 - 10.8 X 10 3/ul Final    RBC 02/29/2024 4.94  4.7 - 6.1 X 10 6/ul Final    RDW-SD 02/29/2024 41.3  37 - 54 fl Final    Hemoglobin 02/29/2024 14.6  14 - 18 g/dL Final    Hematocrit 02/29/2024 44.1  42 - 52 % Final    MCV 02/29/2024 89.3  80 - 94 fl Final    MCH 02/29/2024 29.6  27 - 32 pg Final    MCHC 02/29/2024 33.1  32 - 36 g/dL Final    Platelets 02/29/2024 258  135 - 400 X 10 3/ul Final    Neutrophils 02/29/2024 44.0  34 - 71.1 % Final    Lymphocytes 02/29/2024 43.8  19.3 - 53.1 % Final    Monocytes 02/29/2024 6.7  4.7 - 12.5 % Final    Eosinophils 02/29/2024 4.8  0.7 -  7.0 % Final    Basophils 02/29/2024 0.5  0.2 - 1.2 % Final    Neutrophils Absolute 02/29/2024 2.76  2.15 - 7.56 X 10 3/ul Final    Lymphocytes Absolute 02/29/2024 2.74  0.86 - 4.75 X 10 3/ul Final    Monocytes Absolute 02/29/2024 0.42  0.22 - 1.08 X 10 3/ul Final    Eosinophils Absolute 02/29/2024 0.30  0.04 - 0.54 X 10 3/ul Final    Basophils Absolute 02/29/2024 0.03  0.00 - 0.22 X 10 3/ul Final    Immature Granulocytes Absolute 02/29/2024 0.01  0 - 0.04 X 10 3/ul Final    Immature Granulocytes 02/29/2024 0.2  0 - 0.5 % Final    IG includes metamyelocytes, myelocytes, and promyelocytes    nRBC# 02/29/2024 0.0  0 - 0.2 /100 WBC Final    nRBC Count Absolute 02/29/2024 0.000  0 - 0.012 x 10 3/ul Final    Comment: NOTE  Testing performed at:  [facility], [address] [place] [address] CLIA #:[id number]      Urine Culture, Routine 02/29/2024    Final    Comment: Source: URINE  Site:  Organism #1                    NO GROWTH  Quantity                      0  NOTE  Testing performed at:  [facility], [address] [place] [address] CLIA #:[id number]      TSH 02/29/2024 1.27  0.27 - 4.20 uIU/mL Final    Comment: NOTE  Testing performed at:  [facility], [address] [place] [address] CLIA #:[id number]      T4, Free 02/29/2024 1.44  0.93 - 1.70 ng/dL Final    Comment: NOTE  Testing performed at:  [facility], [address] [place], [place] [id number] CLIA #:[id number]          X-Ray Chest 1 View    Result Date: 7/26/2024  [facility] [place] XR CHEST 1 VW      HISTORY:    Preoperative exam.    ICD10:  Z01.818   Preop testing      REFERENCE EXAMS:    None available      FINDINGS - XR CHEST:    Chest - 1 view.  Cardiac silhouette is magnified by the exam technique.  No acute pulmonary disease demonstrated.    No acute fracture demonstrated.             Duration of encounter:  minutes  This includes face-to-face time and non face-to-face time preparing to see the  patient (eg, review of tests), obtaining and/or reviewing separately obtained history, documenting clinical information in the electronic or other health record, independently interpreting resultsand communicating results to the patient/family/caregiver, or care coordination      DISCLAIMER: This note was prepared with [employer] voice recognition transcription software. Garbled syntax, mangled pronouns, and other bizarre constructions may be attributed to that software system.     All diagnostic data (labs/imaging) was reviewed with the patient and/or family member in the room.  All questions were answered to their liking. The patient and/or family member voiced understanding of all instructions provided. Expectations regarding follow up and treatment plan were voiced and confirmed prior to departure. The patient was given orders/instructions at the end of the visit for reference. They were instructed to notify my office if they have not been contacted for imaging/referrals/labs/results in 1-2 weeks. They voiced understanding of all of the above.     Follow up:     There are no Patient Instructions on file for this visit.     Follow up in about 6 months (around 2/13/2025), or if symptoms worsen or fail to improve.

## 2024-11-19 DIAGNOSIS — K21.9 GASTROESOPHAGEAL REFLUX DISEASE, UNSPECIFIED WHETHER ESOPHAGITIS PRESENT: ICD-10-CM

## 2024-11-20 RX ORDER — PANTOPRAZOLE SODIUM 20 MG/1
20 TABLET, DELAYED RELEASE ORAL DAILY
Qty: 42 TABLET | Refills: 0 | Status: SHIPPED | OUTPATIENT
Start: 2024-11-20 | End: 2025-01-01

## 2024-11-25 ENCOUNTER — PATIENT MESSAGE (OUTPATIENT)
Dept: PRIMARY CARE CLINIC | Facility: CLINIC | Age: 44
End: 2024-11-25
Payer: MEDICAID